# Patient Record
Sex: FEMALE | Race: BLACK OR AFRICAN AMERICAN | Employment: OTHER | URBAN - METROPOLITAN AREA
[De-identification: names, ages, dates, MRNs, and addresses within clinical notes are randomized per-mention and may not be internally consistent; named-entity substitution may affect disease eponyms.]

---

## 2017-01-09 ENCOUNTER — OFFICE VISIT (OUTPATIENT)
Dept: INTERNAL MEDICINE | Age: 72
End: 2017-01-09
Attending: INTERNAL MEDICINE

## 2017-01-09 VITALS
WEIGHT: 199.8 LBS | SYSTOLIC BLOOD PRESSURE: 154 MMHG | OXYGEN SATURATION: 100 % | BODY MASS INDEX: 35.4 KG/M2 | TEMPERATURE: 97.7 F | HEIGHT: 63 IN | RESPIRATION RATE: 18 BRPM | DIASTOLIC BLOOD PRESSURE: 76 MMHG | HEART RATE: 79 BPM

## 2017-01-09 DIAGNOSIS — R26.81 UNSTEADY GAIT: Primary | ICD-10-CM

## 2017-04-11 ENCOUNTER — OFFICE VISIT (OUTPATIENT)
Dept: INTERNAL MEDICINE | Age: 72
End: 2017-04-11
Attending: INTERNAL MEDICINE

## 2017-04-11 VITALS
BODY MASS INDEX: 37.2 KG/M2 | SYSTOLIC BLOOD PRESSURE: 171 MMHG | OXYGEN SATURATION: 97 % | DIASTOLIC BLOOD PRESSURE: 85 MMHG | WEIGHT: 210 LBS | HEART RATE: 86 BPM | RESPIRATION RATE: 20 BRPM

## 2017-04-11 DIAGNOSIS — I10 ESSENTIAL HYPERTENSION: ICD-10-CM

## 2017-04-11 DIAGNOSIS — I48.0 PAROXYSMAL ATRIAL FIBRILLATION (HCC): ICD-10-CM

## 2017-04-11 DIAGNOSIS — I50.32 CHRONIC DIASTOLIC CONGESTIVE HEART FAILURE (HCC): Primary | ICD-10-CM

## 2017-04-11 ASSESSMENT — ENCOUNTER SYMPTOMS
CHOKING: 0
COUGH: 0
CHEST TIGHTNESS: 0
SHORTNESS OF BREATH: 0

## 2017-05-03 RX ORDER — EZETIMIBE 10 MG/1
10 TABLET ORAL DAILY
Qty: 30 TABLET | Refills: 0
Start: 2017-05-03 | End: 2017-08-04 | Stop reason: SDUPTHER

## 2017-05-03 RX ORDER — CARVEDILOL 25 MG/1
25 TABLET ORAL 2 TIMES DAILY WITH MEALS
Qty: 60 TABLET | Refills: 0
Start: 2017-05-03 | End: 2017-08-04 | Stop reason: SDUPTHER

## 2017-05-03 RX ORDER — LISINOPRIL 40 MG/1
40 TABLET ORAL DAILY
Qty: 30 TABLET | Refills: 0
Start: 2017-05-03 | End: 2017-08-04 | Stop reason: SDUPTHER

## 2017-05-03 RX ORDER — FUROSEMIDE 40 MG/1
40 TABLET ORAL DAILY
Qty: 30 TABLET | Refills: 2
Start: 2017-05-03 | End: 2017-08-04 | Stop reason: SDUPTHER

## 2017-05-03 RX ORDER — CARVEDILOL 25 MG/1
25 TABLET ORAL 2 TIMES DAILY WITH MEALS
Qty: 60 TABLET | Refills: 5 | Status: SHIPPED | OUTPATIENT
Start: 2017-05-03 | End: 2017-05-03 | Stop reason: SDUPTHER

## 2017-05-03 RX ORDER — GLIMEPIRIDE 4 MG/1
8 TABLET ORAL EVERY MORNING
Qty: 60 TABLET | Refills: 0
Start: 2017-05-03 | End: 2017-08-04 | Stop reason: SDUPTHER

## 2017-05-03 RX ORDER — AMLODIPINE BESYLATE 10 MG/1
10 TABLET ORAL DAILY
Qty: 30 TABLET | Refills: 0
Start: 2017-05-03 | End: 2017-08-04 | Stop reason: SDUPTHER

## 2017-05-08 ENCOUNTER — OFFICE VISIT (OUTPATIENT)
Dept: INTERNAL MEDICINE | Age: 72
End: 2017-05-08
Attending: INTERNAL MEDICINE

## 2017-05-08 VITALS
DIASTOLIC BLOOD PRESSURE: 96 MMHG | TEMPERATURE: 97.7 F | OXYGEN SATURATION: 99 % | BODY MASS INDEX: 36.68 KG/M2 | HEART RATE: 87 BPM | RESPIRATION RATE: 20 BRPM | HEIGHT: 63 IN | SYSTOLIC BLOOD PRESSURE: 171 MMHG | WEIGHT: 207 LBS

## 2017-05-08 DIAGNOSIS — E55.9 HYPOVITAMINOSIS D: ICD-10-CM

## 2017-05-08 DIAGNOSIS — E78.2 MIXED HYPERLIPIDEMIA: Chronic | ICD-10-CM

## 2017-05-08 DIAGNOSIS — I10 ESSENTIAL HYPERTENSION: Chronic | ICD-10-CM

## 2017-05-08 DIAGNOSIS — E11.9 TYPE 2 DIABETES MELLITUS WITHOUT COMPLICATION, WITHOUT LONG-TERM CURRENT USE OF INSULIN (HCC): Primary | Chronic | ICD-10-CM

## 2017-05-08 LAB
ANION GAP SERPL CALCULATED.3IONS-SCNC: 15 MMOL/L (ref 3–16)
BACTERIA: ABNORMAL /HPF
BASOPHILS ABSOLUTE: 0 K/UL (ref 0–0.2)
BASOPHILS RELATIVE PERCENT: 1 %
BILIRUBIN URINE: NEGATIVE
BLOOD, URINE: ABNORMAL
BUN BLDV-MCNC: 27 MG/DL (ref 7–20)
CALCIUM SERPL-MCNC: 9.1 MG/DL (ref 8.3–10.6)
CHLORIDE BLD-SCNC: 96 MMOL/L (ref 99–110)
CHOLESTEROL, TOTAL: 258 MG/DL (ref 0–199)
CLARITY: ABNORMAL
CO2: 26 MMOL/L (ref 21–32)
COLOR: YELLOW
CREAT SERPL-MCNC: 1.4 MG/DL (ref 0.6–1.2)
CRYSTALS, UA: ABNORMAL /HPF
EOSINOPHILS ABSOLUTE: 0.1 K/UL (ref 0–0.6)
EOSINOPHILS RELATIVE PERCENT: 2.9 %
EPITHELIAL CELLS, UA: ABNORMAL /HPF
GFR AFRICAN AMERICAN: 45
GFR NON-AFRICAN AMERICAN: 37
GLUCOSE BLD-MCNC: 129 MG/DL (ref 70–99)
GLUCOSE URINE: NEGATIVE MG/DL
HCT VFR BLD CALC: 36.9 % (ref 36–48)
HDLC SERPL-MCNC: 87 MG/DL (ref 40–60)
HEMOGLOBIN: 12 G/DL (ref 12–16)
KETONES, URINE: NEGATIVE MG/DL
LDL CHOLESTEROL CALCULATED: 153 MG/DL
LEUKOCYTE ESTERASE, URINE: ABNORMAL
LYMPHOCYTES ABSOLUTE: 1.7 K/UL (ref 1–5.1)
LYMPHOCYTES RELATIVE PERCENT: 35.3 %
MCH RBC QN AUTO: 29.3 PG (ref 26–34)
MCHC RBC AUTO-ENTMCNC: 32.6 G/DL (ref 31–36)
MCV RBC AUTO: 89.8 FL (ref 80–100)
MICROSCOPIC EXAMINATION: YES
MONOCYTES ABSOLUTE: 0.5 K/UL (ref 0–1.3)
MONOCYTES RELATIVE PERCENT: 9.7 %
NEUTROPHILS ABSOLUTE: 2.4 K/UL (ref 1.7–7.7)
NEUTROPHILS RELATIVE PERCENT: 51.1 %
NITRITE, URINE: NEGATIVE
PDW BLD-RTO: 13.9 % (ref 12.4–15.4)
PH UA: 7.5
PLATELET # BLD: 202 K/UL (ref 135–450)
PMV BLD AUTO: 10 FL (ref 5–10.5)
POTASSIUM SERPL-SCNC: 3.8 MMOL/L (ref 3.5–5.1)
PROTEIN UA: 100 MG/DL
RBC # BLD: 4.11 M/UL (ref 4–5.2)
RBC UA: ABNORMAL /HPF (ref 0–2)
SODIUM BLD-SCNC: 137 MMOL/L (ref 136–145)
SPECIFIC GRAVITY UA: 1.01
TRIGL SERPL-MCNC: 92 MG/DL (ref 0–150)
URINE TYPE: ABNORMAL
UROBILINOGEN, URINE: 0.2 E.U./DL
VITAMIN D 25-HYDROXY: 20.4 NG/ML
VLDLC SERPL CALC-MCNC: 18 MG/DL
WBC # BLD: 4.7 K/UL (ref 4–11)
WBC UA: ABNORMAL /HPF (ref 0–5)

## 2017-05-09 LAB
CREATININE URINE: 52.3 MG/DL (ref 28–259)
ESTIMATED AVERAGE GLUCOSE: 165.7 MG/DL
HBA1C MFR BLD: 7.4 %
MICROALBUMIN UR-MCNC: 98.7 MG/DL
MICROALBUMIN/CREAT UR-RTO: 1887.2 MG/G (ref 0–30)

## 2017-08-04 RX ORDER — LISINOPRIL 40 MG/1
40 TABLET ORAL DAILY
Qty: 30 TABLET | Refills: 2
Start: 2017-08-04 | End: 2017-11-15 | Stop reason: ALTCHOICE

## 2017-08-04 RX ORDER — EZETIMIBE 10 MG/1
10 TABLET ORAL DAILY
Qty: 30 TABLET | Refills: 2
Start: 2017-08-04 | End: 2018-03-20 | Stop reason: SDUPTHER

## 2017-08-04 RX ORDER — AMLODIPINE BESYLATE 10 MG/1
10 TABLET ORAL DAILY
Qty: 30 TABLET | Refills: 2
Start: 2017-08-04 | End: 2018-03-19 | Stop reason: SDUPTHER

## 2017-08-04 RX ORDER — GLIMEPIRIDE 4 MG/1
8 TABLET ORAL EVERY MORNING
Qty: 60 TABLET | Refills: 2
Start: 2017-08-04 | End: 2018-03-19 | Stop reason: SDUPTHER

## 2017-08-04 RX ORDER — FUROSEMIDE 40 MG/1
40 TABLET ORAL DAILY
Qty: 30 TABLET | Refills: 2
Start: 2017-08-04 | End: 2018-03-26 | Stop reason: SDUPTHER

## 2017-08-04 RX ORDER — CARVEDILOL 25 MG/1
25 TABLET ORAL 2 TIMES DAILY WITH MEALS
Qty: 60 TABLET | Refills: 2
Start: 2017-08-04 | End: 2018-03-19 | Stop reason: SDUPTHER

## 2017-08-16 ENCOUNTER — OFFICE VISIT (OUTPATIENT)
Dept: INTERNAL MEDICINE | Age: 72
End: 2017-08-16
Attending: STUDENT IN AN ORGANIZED HEALTH CARE EDUCATION/TRAINING PROGRAM

## 2017-08-16 VITALS
WEIGHT: 214.6 LBS | DIASTOLIC BLOOD PRESSURE: 83 MMHG | OXYGEN SATURATION: 100 % | SYSTOLIC BLOOD PRESSURE: 152 MMHG | TEMPERATURE: 97.2 F | HEART RATE: 73 BPM | BODY MASS INDEX: 38.01 KG/M2 | RESPIRATION RATE: 20 BRPM

## 2017-08-16 DIAGNOSIS — E66.9 DIABETES MELLITUS TYPE 2 IN OBESE (HCC): Primary | ICD-10-CM

## 2017-08-16 DIAGNOSIS — E11.69 DIABETES MELLITUS TYPE 2 IN OBESE (HCC): Primary | ICD-10-CM

## 2017-08-16 DIAGNOSIS — I10 ESSENTIAL HYPERTENSION: Chronic | ICD-10-CM

## 2017-08-16 RX ORDER — ROSUVASTATIN CALCIUM 20 MG/1
20 TABLET, COATED ORAL NIGHTLY
Qty: 60 TABLET | Refills: 3 | Status: SHIPPED | OUTPATIENT
Start: 2017-08-16 | End: 2018-04-25 | Stop reason: SDUPTHER

## 2017-08-16 ASSESSMENT — ENCOUNTER SYMPTOMS
GASTROINTESTINAL NEGATIVE: 1
RESPIRATORY NEGATIVE: 1
EYES NEGATIVE: 1

## 2017-09-26 ENCOUNTER — OFFICE VISIT (OUTPATIENT)
Dept: INTERNAL MEDICINE | Age: 72
End: 2017-09-26
Attending: INTERNAL MEDICINE

## 2017-09-26 VITALS
BODY MASS INDEX: 38.44 KG/M2 | DIASTOLIC BLOOD PRESSURE: 89 MMHG | OXYGEN SATURATION: 97 % | WEIGHT: 217 LBS | HEART RATE: 84 BPM | RESPIRATION RATE: 20 BRPM | SYSTOLIC BLOOD PRESSURE: 166 MMHG

## 2017-09-26 DIAGNOSIS — I48.0 PAROXYSMAL ATRIAL FIBRILLATION (HCC): ICD-10-CM

## 2017-09-26 DIAGNOSIS — I10 ESSENTIAL HYPERTENSION: ICD-10-CM

## 2017-09-26 DIAGNOSIS — I50.32 CHRONIC DIASTOLIC CONGESTIVE HEART FAILURE (HCC): Primary | ICD-10-CM

## 2017-09-26 ASSESSMENT — ENCOUNTER SYMPTOMS
CHOKING: 0
COUGH: 0
CHEST TIGHTNESS: 0
SHORTNESS OF BREATH: 0

## 2017-11-15 ENCOUNTER — OFFICE VISIT (OUTPATIENT)
Dept: INTERNAL MEDICINE | Age: 72
End: 2017-11-15
Attending: STUDENT IN AN ORGANIZED HEALTH CARE EDUCATION/TRAINING PROGRAM

## 2017-11-15 DIAGNOSIS — E66.9 DIABETES MELLITUS TYPE 2 IN OBESE (HCC): Primary | ICD-10-CM

## 2017-11-15 DIAGNOSIS — E11.69 DIABETES MELLITUS TYPE 2 IN OBESE (HCC): Primary | ICD-10-CM

## 2017-11-15 DIAGNOSIS — E11.22 CKD STAGE 3 DUE TO TYPE 2 DIABETES MELLITUS (HCC): ICD-10-CM

## 2017-11-15 DIAGNOSIS — I10 ESSENTIAL HYPERTENSION: Chronic | ICD-10-CM

## 2017-11-15 DIAGNOSIS — N18.30 CKD STAGE 3 DUE TO TYPE 2 DIABETES MELLITUS (HCC): ICD-10-CM

## 2017-11-15 RX ORDER — VALSARTAN 320 MG/1
320 TABLET ORAL DAILY
Qty: 30 TABLET | Refills: 3 | Status: SHIPPED | OUTPATIENT
Start: 2017-11-15 | End: 2018-03-20 | Stop reason: ALTCHOICE

## 2017-11-16 NOTE — PROGRESS NOTES
WBCUA 3-5 05/08/2017    RBCUA 0-2 05/08/2017    BACTERIA 4+ 05/08/2017    CLARITYU CLOUDY 05/08/2017    SPECGRAV 1.015 05/08/2017    LEUKOCYTESUR SMALL 05/08/2017    BLOODU TRACE-INTACT 05/08/2017    GLUCOSEU Negative 05/08/2017             RADIOLOGY / OTHER PROCEDURES:     No orders to display           Assessment/Plan     Fabi Calhoun a 65 y. o. female, who is here for follow-up of HTN, DM, CKD (Cr 1.2-1.4 over past 2 years), and diastolic CHF.     DM type 2  Most recent Hba1c 7.4% on 5/8/2017, to be repeated, drawn before next visit.  -Continue Amaryl 4mg daily  -Still needs to schedule appointment with Ophthalmology for diabetic eye exam, will continue to remind her      Essential HTN - BP elevated today. Pt measures at home, usually 130-150s/70s-90s. - Stop Lisinopril 40 mg daily  - Start valsartan 320 daily  - Cont lasix 40 mg daily  - Change Amlodipine 10 mg to nightly w second dose of carvedilol     Diastolic CHF  -Cont Carvedilol 25 mg bid      HLD  -Continue rosuvastatin 20mg daily     Vit D deficiency  -Continue calcium-Vit D and additional Vit supplements for 800IU daily      Pt will f/u in 2 months, labs ordered at this visit, to be drawn before next one. Patient Active Problem List    Diagnosis Date Noted    Pre-syncope 08/18/2014    PAT (acute kidney injury) ([facility] 08/18/2014    Diabetes mellitus ([facility] 09/04/2012    HTN (hypertension) 09/04/2012    HLD (hyperlipidemia) 09/04/2012    Paroxysmal a-fib ([facility] 09/04/2012    Hypovitaminosis D 84/91/7239    Diastolic CHF, chronic ([facility] 09/04/2012    Depression 09/04/2012             Discussed with attending:  Dr. Ciro Davila        Signed,     Rudell Sever, MD - PGY1  Pager #: [phone]    11/16/2017  5:01 AM     Addendum to Resident H& P/Progress note:  I have personally seen,examined and evaluated the patient.  I have reviewed the current history, physical findings, labs and assessment and plan and agree with note as documented by resident MD ( )      Néstor Ardon MD, [address]

## 2018-03-16 ENCOUNTER — TELEPHONE (OUTPATIENT)
Dept: INTERNAL MEDICINE | Age: 73
End: 2018-03-16

## 2018-03-16 NOTE — TELEPHONE ENCOUNTER
Received refill request for Lisinopril which was discontinued 11-16-17. Other medications not refilled since October 2017 according to [facility] record. Called patient  and she explained that she had lost her medications. she got  new prescriptions in August 2017 with 2 refills. After filling these she found the old medications that were lost and states she has been taking them. I told her the Lisinopril was stopped but she thinks she has been taking it. Patient has appointment in [facility] on Tuesday 3-20-18. She states she has enough medication until then. I told her to bring in all her bottles to reconcile her medications with   3-20-18. We can then do refills if no changes made by .

## 2018-03-19 DIAGNOSIS — E66.9 DIABETES MELLITUS TYPE 2 IN OBESE (HCC): ICD-10-CM

## 2018-03-19 DIAGNOSIS — E11.22 CKD STAGE 3 DUE TO TYPE 2 DIABETES MELLITUS (HCC): ICD-10-CM

## 2018-03-19 DIAGNOSIS — I10 ESSENTIAL HYPERTENSION: Chronic | ICD-10-CM

## 2018-03-19 DIAGNOSIS — N18.30 CKD STAGE 3 DUE TO TYPE 2 DIABETES MELLITUS (HCC): ICD-10-CM

## 2018-03-19 DIAGNOSIS — E11.69 DIABETES MELLITUS TYPE 2 IN OBESE (HCC): ICD-10-CM

## 2018-03-19 LAB
ALBUMIN SERPL-MCNC: 3.8 G/DL (ref 3.4–5)
ANION GAP SERPL CALCULATED.3IONS-SCNC: 13 MMOL/L (ref 3–16)
BASOPHILS ABSOLUTE: 0 K/UL (ref 0–0.2)
BASOPHILS RELATIVE PERCENT: 0.5 %
BUN BLDV-MCNC: 34 MG/DL (ref 7–20)
CALCIUM SERPL-MCNC: 8.4 MG/DL (ref 8.3–10.6)
CHLORIDE BLD-SCNC: 101 MMOL/L (ref 99–110)
CHOLESTEROL, TOTAL: 238 MG/DL (ref 0–199)
CO2: 25 MMOL/L (ref 21–32)
CREAT SERPL-MCNC: 1.5 MG/DL (ref 0.6–1.2)
CREATININE URINE: 81.6 MG/DL (ref 28–259)
EOSINOPHILS ABSOLUTE: 0.1 K/UL (ref 0–0.6)
EOSINOPHILS RELATIVE PERCENT: 1.3 %
ESTIMATED AVERAGE GLUCOSE: 246 MG/DL
GFR AFRICAN AMERICAN: 41
GFR NON-AFRICAN AMERICAN: 34
GLUCOSE BLD-MCNC: 277 MG/DL (ref 70–99)
HBA1C MFR BLD: 10.2 %
HCT VFR BLD CALC: 34 % (ref 36–48)
HDLC SERPL-MCNC: 80 MG/DL (ref 40–60)
HEMOGLOBIN: 11.3 G/DL (ref 12–16)
LDL CHOLESTEROL CALCULATED: 126 MG/DL
LYMPHOCYTES ABSOLUTE: 1.6 K/UL (ref 1–5.1)
LYMPHOCYTES RELATIVE PERCENT: 33.5 %
MAGNESIUM: 2.4 MG/DL (ref 1.8–2.4)
MCH RBC QN AUTO: 29.7 PG (ref 26–34)
MCHC RBC AUTO-ENTMCNC: 33.3 G/DL (ref 31–36)
MCV RBC AUTO: 89.2 FL (ref 80–100)
MICROALBUMIN UR-MCNC: 88.9 MG/DL
MICROALBUMIN/CREAT UR-RTO: 1089.5 MG/G (ref 0–30)
MONOCYTES ABSOLUTE: 0.6 K/UL (ref 0–1.3)
MONOCYTES RELATIVE PERCENT: 13.6 %
NEUTROPHILS ABSOLUTE: 2.4 K/UL (ref 1.7–7.7)
NEUTROPHILS RELATIVE PERCENT: 51.1 %
PDW BLD-RTO: 15.1 % (ref 12.4–15.4)
PHOSPHORUS: 3.8 MG/DL (ref 2.5–4.9)
PLATELET # BLD: 229 K/UL (ref 135–450)
PMV BLD AUTO: 10.3 FL (ref 5–10.5)
POTASSIUM SERPL-SCNC: 4.8 MMOL/L (ref 3.5–5.1)
RBC # BLD: 3.81 M/UL (ref 4–5.2)
SODIUM BLD-SCNC: 139 MMOL/L (ref 136–145)
TRIGL SERPL-MCNC: 161 MG/DL (ref 0–150)
TSH REFLEX: 2.52 UIU/ML (ref 0.27–4.2)
VLDLC SERPL CALC-MCNC: 32 MG/DL
WBC # BLD: 4.7 K/UL (ref 4–11)

## 2018-03-19 RX ORDER — CARVEDILOL 25 MG/1
25 TABLET ORAL 2 TIMES DAILY WITH MEALS
Qty: 60 TABLET | Refills: 3
Start: 2018-03-19 | End: 2018-04-25 | Stop reason: SDUPTHER

## 2018-03-19 RX ORDER — GLIMEPIRIDE 4 MG/1
8 TABLET ORAL EVERY MORNING
Qty: 60 TABLET | Refills: 2 | OUTPATIENT
Start: 2018-03-19 | End: 2018-04-25 | Stop reason: SDUPTHER

## 2018-03-19 RX ORDER — LISINOPRIL 40 MG/1
TABLET ORAL
COMMUNITY
Start: 2018-01-10 | End: 2018-03-20 | Stop reason: ALTCHOICE

## 2018-03-19 RX ORDER — AMLODIPINE BESYLATE 10 MG/1
10 TABLET ORAL DAILY
Qty: 30 TABLET | Refills: 3
Start: 2018-03-19 | End: 2018-04-25 | Stop reason: SDUPTHER

## 2018-03-20 ENCOUNTER — OFFICE VISIT (OUTPATIENT)
Dept: INTERNAL MEDICINE | Age: 73
End: 2018-03-20
Attending: INTERNAL MEDICINE

## 2018-03-20 VITALS
DIASTOLIC BLOOD PRESSURE: 64 MMHG | SYSTOLIC BLOOD PRESSURE: 130 MMHG | BODY MASS INDEX: 38.09 KG/M2 | WEIGHT: 215 LBS | HEART RATE: 82 BPM | RESPIRATION RATE: 18 BRPM | OXYGEN SATURATION: 98 %

## 2018-03-20 DIAGNOSIS — I10 ESSENTIAL HYPERTENSION: ICD-10-CM

## 2018-03-20 DIAGNOSIS — I48.0 PAROXYSMAL ATRIAL FIBRILLATION (HCC): ICD-10-CM

## 2018-03-20 DIAGNOSIS — I50.32 CHRONIC DIASTOLIC CONGESTIVE HEART FAILURE (HCC): Primary | ICD-10-CM

## 2018-03-20 RX ORDER — LISINOPRIL 40 MG/1
40 TABLET ORAL DAILY
Qty: 30 TABLET | Refills: 2 | OUTPATIENT
Start: 2018-03-20 | End: 2018-04-25 | Stop reason: SDUPTHER

## 2018-03-20 RX ORDER — EZETIMIBE 10 MG/1
10 TABLET ORAL DAILY
Qty: 30 TABLET | Refills: 2
Start: 2018-03-20 | End: 2018-04-25 | Stop reason: SDUPTHER

## 2018-03-20 ASSESSMENT — ENCOUNTER SYMPTOMS
COUGH: 0
SHORTNESS OF BREATH: 0
CHOKING: 0
CHEST TIGHTNESS: 0

## 2018-03-20 NOTE — PATIENT INSTRUCTIONS
Return to clinic 6 months    See Dr Arango Rank next week    Continue lisinopril 40 mg    Do not start valsartan    Dr Barbara Berry [phone] kidney doctor- need to make appointment

## 2018-03-20 NOTE — PROGRESS NOTES
HENT:   Head: Normocephalic and atraumatic. Eyes: Conjunctivae and EOM are normal. Right eye exhibits no discharge. Left eye exhibits no discharge. Neck: Normal range of motion. No JVD present. Cardiovascular: Normal rate, regular rhythm, S1 normal, S2 normal and normal heart sounds. Exam reveals no gallop. No murmur heard. Pulses:       Radial pulses are 2+ on the right side, and 2+ on the left side. Pulmonary/Chest: Effort normal and breath sounds normal. No respiratory distress. She has no wheezes. She has no rales. Abdominal: Soft. Bowel sounds are normal. There is no tenderness. Musculoskeletal: Normal range of motion. She exhibits no edema. Neurological: She is alert and oriented to person, place, and time. Skin: Skin is warm and dry. Psychiatric: She has a normal mood and affect. Her behavior is normal. Thought content normal.       Assessment:      1. Chronic diastolic congestive heart failure (HCC)     2. Paroxysmal atrial fibrillation ([address]     3. Essential hypertension       \      Plan:      CV stable. Rhythm stable. BP reasonable. Will have IM address DM. Renal evaluation. Continues off cigarettes. Reviewed previous records and testing including echo 4/16. No changes. Continue to monitor. Follow up 6 months.

## 2018-03-26 RX ORDER — FUROSEMIDE 40 MG/1
40 TABLET ORAL DAILY
Qty: 30 TABLET | Refills: 1
Start: 2018-03-26 | End: 2018-04-25 | Stop reason: SDUPTHER

## 2018-04-25 ENCOUNTER — OFFICE VISIT (OUTPATIENT)
Dept: INTERNAL MEDICINE | Age: 73
End: 2018-04-25
Attending: STUDENT IN AN ORGANIZED HEALTH CARE EDUCATION/TRAINING PROGRAM

## 2018-04-25 VITALS
SYSTOLIC BLOOD PRESSURE: 129 MMHG | DIASTOLIC BLOOD PRESSURE: 65 MMHG | HEART RATE: 80 BPM | OXYGEN SATURATION: 95 % | WEIGHT: 214 LBS | TEMPERATURE: 97.7 F | BODY MASS INDEX: 37.91 KG/M2 | RESPIRATION RATE: 20 BRPM

## 2018-04-25 DIAGNOSIS — E11.9 TYPE 2 DIABETES MELLITUS WITHOUT COMPLICATION, WITHOUT LONG-TERM CURRENT USE OF INSULIN (HCC): Primary | Chronic | ICD-10-CM

## 2018-04-25 DIAGNOSIS — H54.40 BLINDNESS OF RIGHT EYE: ICD-10-CM

## 2018-04-25 RX ORDER — ASPIRIN 81 MG/1
81 TABLET, CHEWABLE ORAL DAILY
Qty: 90 TABLET | Refills: 3 | Status: SHIPPED | OUTPATIENT
Start: 2018-04-25 | End: 2018-09-12 | Stop reason: SDUPTHER

## 2018-04-25 RX ORDER — LISINOPRIL 40 MG/1
40 TABLET ORAL DAILY
Qty: 30 TABLET | Refills: 2 | Status: SHIPPED | OUTPATIENT
Start: 2018-04-25 | End: 2018-09-12 | Stop reason: SDUPTHER

## 2018-04-25 RX ORDER — CARVEDILOL 25 MG/1
25 TABLET ORAL 2 TIMES DAILY WITH MEALS
Qty: 60 TABLET | Refills: 3 | Status: SHIPPED | OUTPATIENT
Start: 2018-04-25 | End: 2018-09-12 | Stop reason: SDUPTHER

## 2018-04-25 RX ORDER — GLIMEPIRIDE 4 MG/1
8 TABLET ORAL EVERY MORNING
Qty: 60 TABLET | Refills: 3 | Status: SHIPPED | OUTPATIENT
Start: 2018-04-25 | End: 2018-09-12 | Stop reason: SDUPTHER

## 2018-04-25 RX ORDER — ROSUVASTATIN CALCIUM 20 MG/1
20 TABLET, COATED ORAL NIGHTLY
Qty: 60 TABLET | Refills: 3 | Status: SHIPPED | OUTPATIENT
Start: 2018-04-25 | End: 2018-05-02 | Stop reason: CLARIF

## 2018-04-25 RX ORDER — AMLODIPINE BESYLATE 10 MG/1
10 TABLET ORAL DAILY
Qty: 30 TABLET | Refills: 3 | Status: SHIPPED | OUTPATIENT
Start: 2018-04-25 | End: 2018-09-12 | Stop reason: SDUPTHER

## 2018-04-25 RX ORDER — FUROSEMIDE 40 MG/1
40 TABLET ORAL DAILY
Qty: 30 TABLET | Refills: 2 | Status: SHIPPED | OUTPATIENT
Start: 2018-04-25 | End: 2018-09-12 | Stop reason: SDUPTHER

## 2018-04-25 RX ORDER — EZETIMIBE 10 MG/1
10 TABLET ORAL DAILY
Qty: 30 TABLET | Refills: 2 | Status: SHIPPED | OUTPATIENT
Start: 2018-04-25 | End: 2018-09-12 | Stop reason: SDUPTHER

## 2018-05-02 RX ORDER — ATORVASTATIN CALCIUM 20 MG/1
40 TABLET, FILM COATED ORAL DAILY
Qty: 30 TABLET | Refills: 3 | Status: SHIPPED | OUTPATIENT
Start: 2018-05-02 | End: 2018-09-12 | Stop reason: SDUPTHER

## 2018-08-30 LAB
ESTIMATED AVERAGE GLUCOSE: 165.7 MG/DL
HBA1C MFR BLD: 7.4 %

## 2018-09-12 ENCOUNTER — OFFICE VISIT (OUTPATIENT)
Dept: INTERNAL MEDICINE CLINIC | Age: 73
End: 2018-09-12
Payer: MEDICARE

## 2018-09-12 VITALS
RESPIRATION RATE: 20 BRPM | TEMPERATURE: 98 F | SYSTOLIC BLOOD PRESSURE: 122 MMHG | WEIGHT: 215 LBS | DIASTOLIC BLOOD PRESSURE: 74 MMHG | HEART RATE: 76 BPM | OXYGEN SATURATION: 98 % | BODY MASS INDEX: 38.09 KG/M2

## 2018-09-12 DIAGNOSIS — I10 HYPERTENSION, UNSPECIFIED TYPE: Primary | ICD-10-CM

## 2018-09-12 PROCEDURE — 99213 OFFICE O/P EST LOW 20 MIN: CPT | Performed by: STUDENT IN AN ORGANIZED HEALTH CARE EDUCATION/TRAINING PROGRAM

## 2018-09-12 RX ORDER — ASPIRIN 81 MG/1
81 TABLET, CHEWABLE ORAL DAILY
Qty: 90 TABLET | Refills: 3 | Status: SHIPPED | OUTPATIENT
Start: 2018-09-12 | End: 2020-09-25 | Stop reason: SDUPTHER

## 2018-09-12 RX ORDER — GLIMEPIRIDE 4 MG/1
8 TABLET ORAL EVERY MORNING
Qty: 60 TABLET | Refills: 3 | Status: SHIPPED | OUTPATIENT
Start: 2018-09-12 | End: 2019-10-16 | Stop reason: SDUPTHER

## 2018-09-12 RX ORDER — ATORVASTATIN CALCIUM 20 MG/1
40 TABLET, FILM COATED ORAL DAILY
Qty: 30 TABLET | Refills: 3 | Status: SHIPPED | OUTPATIENT
Start: 2018-09-12 | End: 2018-10-17 | Stop reason: DRUGHIGH

## 2018-09-12 RX ORDER — CARVEDILOL 25 MG/1
25 TABLET ORAL 2 TIMES DAILY WITH MEALS
Qty: 60 TABLET | Refills: 3 | Status: SHIPPED | OUTPATIENT
Start: 2018-09-12 | End: 2019-07-30 | Stop reason: SDUPTHER

## 2018-09-12 RX ORDER — EZETIMIBE 10 MG/1
10 TABLET ORAL DAILY
Qty: 30 TABLET | Refills: 3 | Status: SHIPPED | OUTPATIENT
Start: 2018-09-12 | End: 2019-07-30 | Stop reason: SDUPTHER

## 2018-09-12 RX ORDER — AMLODIPINE BESYLATE 10 MG/1
10 TABLET ORAL DAILY
Qty: 30 TABLET | Refills: 3 | Status: SHIPPED | OUTPATIENT
Start: 2018-09-12 | End: 2019-07-30 | Stop reason: SDUPTHER

## 2018-09-12 RX ORDER — FUROSEMIDE 40 MG/1
40 TABLET ORAL DAILY
Qty: 30 TABLET | Refills: 3 | Status: SHIPPED | OUTPATIENT
Start: 2018-09-12 | End: 2019-07-30 | Stop reason: SDUPTHER

## 2018-09-12 RX ORDER — LISINOPRIL 40 MG/1
40 TABLET ORAL DAILY
Qty: 30 TABLET | Refills: 3 | Status: SHIPPED | OUTPATIENT
Start: 2018-09-12 | End: 2019-07-30 | Stop reason: SDUPTHER

## 2018-09-12 NOTE — PROGRESS NOTES
Department of Internal Medicine  OUTPATIENT CLINIC  Medical Resident Progress Note    Date: 09/12/18                                               Subjective:     74 y.o.  female presents to clinic today for follow up of hypertension, DM2, CKD (Cr 1.5 in March 7958), and diastolic HF. She has no complaints and is compliant with her medications, taking them as discussed last visit. Most recent A1c on 8/29 was 7.4% after patient changed her diet dramatically upon finding out her HbA1c in March 2017 was 10.2. Still maintaining a reasonably healthy diet with large amount of vegetables and less sugary foods, occasionally will have fried chicken or other fatty foods. She still checks her blood sugars and BPs regularly, and her 3 month average sugar on her glucometer is 135. Her BPs have been lower than prior after addition of valsartan. Her only complaint is feeling like her vision has been gradually getting worse over the past few months, and she plans to see an ophthalmologist before our next visit. She takes several extra vitamins and supplements including vitamin E, D, Ca, omega 3. She denies any headache, fever, malaise, CP, SOB, increased FARRELL, n/v/d, dysuria, changes in BMs. She still refuses any vaccines, colonoscopy, or mammograms, benefits of early detection/prevention of disease discussed. Objective:     Review of Systems  ROS negative except as described in HPI      Vitals:  /74 (Site: Left Upper Arm, Position: Sitting, Cuff Size: Medium Adult)   Pulse 76   Temp 98 °F (36.7 °C)   Resp 20   Wt 215 lb (97.5 kg)   SpO2 98%   BMI 38.09 kg/m²       PHYSICAL EXAM:  Physical Exam  · General appearance: alert, pleasant, appears younger than stated age, cooperative  · Skin: Skin color, texture, turgor normal.   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    · Lungs: clear to auscultation

## 2018-09-12 NOTE — PATIENT INSTRUCTIONS
Before any of you medication is completely gone, call your pharmacy AT LEAST 1 WEEK ahead for refills. Review all information regarding your medication before starting. If you become ill when the clinic is closed, please call the [facility] [employer]  at   #[phone] and ask the  to page the AOD between 6:00 AM and 6:00 PM or page the AON between 6:00 PM and 6:00 am    The clinic is not able to process MY CHART requests for appointments or messages including requests. Please call the [address] [place] [address] at [phone]  For appointments and messages. Call your pharmacy for medication refills.        Return to clinic 6 months    Continue medication as listed on discharge sheet    Instructions reviewed before discharge and copy given to patient    [address] [phone]

## 2018-10-17 RX ORDER — ATORVASTATIN CALCIUM 40 MG/1
40 TABLET, FILM COATED ORAL DAILY
Qty: 30 TABLET | Refills: 3 | OUTPATIENT
Start: 2018-10-17 | End: 2019-07-30 | Stop reason: SDUPTHER

## 2018-11-06 ENCOUNTER — OFFICE VISIT (OUTPATIENT)
Dept: INTERNAL MEDICINE CLINIC | Age: 73
End: 2018-11-06
Payer: MEDICARE

## 2018-11-06 VITALS
OXYGEN SATURATION: 95 % | SYSTOLIC BLOOD PRESSURE: 147 MMHG | WEIGHT: 205.5 LBS | HEART RATE: 91 BPM | RESPIRATION RATE: 21 BRPM | BODY MASS INDEX: 36.4 KG/M2 | DIASTOLIC BLOOD PRESSURE: 79 MMHG

## 2018-11-06 DIAGNOSIS — I10 ESSENTIAL HYPERTENSION: ICD-10-CM

## 2018-11-06 DIAGNOSIS — I48.0 PAROXYSMAL ATRIAL FIBRILLATION (HCC): ICD-10-CM

## 2018-11-06 DIAGNOSIS — I50.32 CHRONIC DIASTOLIC CONGESTIVE HEART FAILURE (HCC): Primary | ICD-10-CM

## 2018-11-06 PROCEDURE — 99213 OFFICE O/P EST LOW 20 MIN: CPT | Performed by: INTERNAL MEDICINE

## 2018-11-06 PROCEDURE — 99215 OFFICE O/P EST HI 40 MIN: CPT | Performed by: INTERNAL MEDICINE

## 2018-11-06 ASSESSMENT — ENCOUNTER SYMPTOMS
CHOKING: 0
SHORTNESS OF BREATH: 0
COUGH: 0
CHEST TIGHTNESS: 0

## 2019-03-08 DIAGNOSIS — E11.9 TYPE 2 DIABETES MELLITUS WITHOUT COMPLICATION, WITHOUT LONG-TERM CURRENT USE OF INSULIN (HCC): Chronic | ICD-10-CM

## 2019-03-08 DIAGNOSIS — I10 ESSENTIAL HYPERTENSION: ICD-10-CM

## 2019-03-08 LAB
ALBUMIN SERPL-MCNC: 4.1 G/DL (ref 3.4–5)
ALP BLD-CCNC: 92 U/L (ref 40–129)
ALT SERPL-CCNC: 8 U/L (ref 10–40)
AST SERPL-CCNC: 13 U/L (ref 15–37)
BILIRUB SERPL-MCNC: <0.2 MG/DL (ref 0–1)
BILIRUBIN DIRECT: <0.2 MG/DL (ref 0–0.3)
BILIRUBIN, INDIRECT: ABNORMAL MG/DL (ref 0–1)
CHOLESTEROL, FASTING: 222 MG/DL (ref 0–199)
HDLC SERPL-MCNC: 73 MG/DL (ref 40–60)
LDL CHOLESTEROL CALCULATED: 127 MG/DL
TOTAL PROTEIN: 7.3 G/DL (ref 6.4–8.2)
TRIGLYCERIDE, FASTING: 109 MG/DL (ref 0–150)
VLDLC SERPL CALC-MCNC: 22 MG/DL

## 2019-03-09 LAB
ESTIMATED AVERAGE GLUCOSE: 180 MG/DL
HBA1C MFR BLD: 7.9 %

## 2019-03-10 LAB — FRUCTOSAMINE: 352 UMOL/L (ref 170–285)

## 2019-03-13 ENCOUNTER — OFFICE VISIT (OUTPATIENT)
Dept: INTERNAL MEDICINE CLINIC | Age: 74
End: 2019-03-13
Payer: MEDICARE

## 2019-03-13 VITALS
OXYGEN SATURATION: 96 % | BODY MASS INDEX: 37.36 KG/M2 | TEMPERATURE: 98.8 F | WEIGHT: 210.9 LBS | RESPIRATION RATE: 16 BRPM | SYSTOLIC BLOOD PRESSURE: 142 MMHG | HEART RATE: 82 BPM | DIASTOLIC BLOOD PRESSURE: 81 MMHG

## 2019-03-13 DIAGNOSIS — Z00.00 PREVENTATIVE HEALTH CARE: Primary | ICD-10-CM

## 2019-03-13 DIAGNOSIS — Z79.818 PREVENTATIVE USE OF AGENTS AFFECTING ESTROGEN RECEPTORS OR LEVELS: ICD-10-CM

## 2019-03-13 DIAGNOSIS — E11.9 TYPE 2 DIABETES MELLITUS WITHOUT COMPLICATION, WITHOUT LONG-TERM CURRENT USE OF INSULIN (HCC): ICD-10-CM

## 2019-03-13 DIAGNOSIS — I50.32 DIASTOLIC CHF, CHRONIC (HCC): ICD-10-CM

## 2019-03-13 DIAGNOSIS — N18.30 CHRONIC KIDNEY DISEASE, STAGE III (MODERATE) (HCC): ICD-10-CM

## 2019-03-13 DIAGNOSIS — R53.83 FATIGUE, UNSPECIFIED TYPE: ICD-10-CM

## 2019-03-13 DIAGNOSIS — I10 ESSENTIAL HYPERTENSION: ICD-10-CM

## 2019-03-13 PROCEDURE — 99213 OFFICE O/P EST LOW 20 MIN: CPT | Performed by: STUDENT IN AN ORGANIZED HEALTH CARE EDUCATION/TRAINING PROGRAM

## 2019-05-14 ENCOUNTER — OFFICE VISIT (OUTPATIENT)
Dept: INTERNAL MEDICINE CLINIC | Age: 74
End: 2019-05-14
Payer: MEDICARE

## 2019-05-14 VITALS
SYSTOLIC BLOOD PRESSURE: 146 MMHG | DIASTOLIC BLOOD PRESSURE: 77 MMHG | WEIGHT: 212.6 LBS | OXYGEN SATURATION: 95 % | HEIGHT: 64 IN | HEART RATE: 93 BPM | BODY MASS INDEX: 36.29 KG/M2 | RESPIRATION RATE: 20 BRPM

## 2019-05-14 DIAGNOSIS — I48.0 PAROXYSMAL ATRIAL FIBRILLATION (HCC): ICD-10-CM

## 2019-05-14 DIAGNOSIS — I50.32 CHRONIC DIASTOLIC CONGESTIVE HEART FAILURE (HCC): Primary | ICD-10-CM

## 2019-05-14 DIAGNOSIS — I10 ESSENTIAL HYPERTENSION: ICD-10-CM

## 2019-05-14 PROCEDURE — 99213 OFFICE O/P EST LOW 20 MIN: CPT | Performed by: INTERNAL MEDICINE

## 2019-05-14 ASSESSMENT — ENCOUNTER SYMPTOMS
CHOKING: 0
COUGH: 0
CHEST TIGHTNESS: 0
SHORTNESS OF BREATH: 0

## 2019-05-14 NOTE — PROGRESS NOTES
Subjective:      Patient ID: Virginia Brizuela is a 74 y.o. female. Hypertension   Pertinent negatives include no chest pain, palpitations or shortness of breath. Here for follow up afib/HTN/CHF. No complaints. Feeling good. No sob. No pnd or orthopnea. BP good at home. No tachycardia. Rhythm stable. No pnd or orthopnea. Past Medical History:   Diagnosis Date    Atrial fibrillation (HCC)     CKD (chronic kidney disease) stage 3, GFR 30-59 ml/min (HCC)     Diabetes mellitus type II     Diastolic CHF, chronic (HCC)     HTN (hypertension)     Hyperlipidemia     Tobacco abuse      No past surgical history on file. Social History     Socioeconomic History    Marital status:      Spouse name: Not on file    Number of children: Not on file    Years of education: Not on file    Highest education level: Not on file   Occupational History    Not on file   Social Needs    Financial resource strain: Not on file    Food insecurity:     Worry: Not on file     Inability: Not on file    Transportation needs:     Medical: Not on file     Non-medical: Not on file   Tobacco Use    Smoking status: Former Smoker     Packs/day: 0.33     Types: Cigarettes    Tobacco comment: Trying to quit   Substance and Sexual Activity    Alcohol use:  Yes     Alcohol/week: 0.0 oz     Comment: about a glass a week, of liquor    Drug use: Not on file    Sexual activity: Not on file   Lifestyle    Physical activity:     Days per week: Not on file     Minutes per session: Not on file    Stress: Not on file   Relationships    Social connections:     Talks on phone: Not on file     Gets together: Not on file     Attends Yarsanism service: Not on file     Active member of club or organization: Not on file     Attends meetings of clubs or organizations: Not on file     Relationship status: Not on file    Intimate partner violence:     Fear of current or ex partner: Not on file     Emotionally abused: Not on file Physically abused: Not on file     Forced sexual activity: Not on file   Other Topics Concern    Not on file   Social History Narrative    Not on file     FH reviewed, noncontributory      Vitals:    05/14/19 0957   BP: (!) 146/77   Pulse: 93   Resp: 20   SpO2: 95%         Review of Systems   Constitutional: Negative for activity change, appetite change and fatigue. Respiratory: Negative for cough, choking, chest tightness and shortness of breath. Cardiovascular: Negative for chest pain, palpitations and leg swelling. Denies PND or orthopnea. No tachycardia or syncope. Neurological: Negative for dizziness, syncope and light-headedness. Psychiatric/Behavioral: Negative for agitation, behavioral problems and confusion. Other systems reviewed negative as done        Objective:   Physical Exam   Constitutional: She is oriented to person, place, and time. She appears well-developed and well-nourished. No distress. HENT:   Head: Normocephalic and atraumatic. Eyes: Conjunctivae and EOM are normal. Right eye exhibits no discharge. Left eye exhibits no discharge. Neck: Normal range of motion. No JVD present. Cardiovascular: Normal rate, regular rhythm, S1 normal, S2 normal and normal heart sounds. Exam reveals no gallop. No murmur heard. Pulses:       Radial pulses are 2+ on the right side, and 2+ on the left side. Pulmonary/Chest: Effort normal and breath sounds normal. No respiratory distress. She has no wheezes. She has no rales. Abdominal: Soft. Bowel sounds are normal. There is no tenderness. Musculoskeletal: Normal range of motion. She exhibits no edema. Neurological: She is alert and oriented to person, place, and time. Skin: Skin is warm and dry. Psychiatric: She has a normal mood and affect. Her behavior is normal. Thought content normal.       Assessment:       Diagnosis Orders   1. Chronic diastolic congestive heart failure (HCC)  Handicap placard   2.  Paroxysmal atrial fibrillation (HCC)  Handicap placard   3. Essential hypertension  Handicap placard           Plan:      CV stable. Feeling good. Rhythm stable. BP reasonable at home. Continues off cigarettes for 3 yrs. Reviewed previous records and testing including echo 4/16. No changes. Continue to monitor. Follow up 6 months.

## 2019-07-30 ENCOUNTER — OFFICE VISIT (OUTPATIENT)
Dept: INTERNAL MEDICINE CLINIC | Age: 74
End: 2019-07-30
Payer: MEDICARE

## 2019-07-30 DIAGNOSIS — E78.41 ELEVATED LIPOPROTEIN(A): ICD-10-CM

## 2019-07-30 DIAGNOSIS — E11.8 TYPE 2 DIABETES MELLITUS WITH COMPLICATION, WITHOUT LONG-TERM CURRENT USE OF INSULIN (HCC): Primary | ICD-10-CM

## 2019-07-30 DIAGNOSIS — I50.32 CHRONIC DIASTOLIC CONGESTIVE HEART FAILURE (HCC): ICD-10-CM

## 2019-07-30 PROCEDURE — 99213 OFFICE O/P EST LOW 20 MIN: CPT | Performed by: STUDENT IN AN ORGANIZED HEALTH CARE EDUCATION/TRAINING PROGRAM

## 2019-07-30 RX ORDER — AMLODIPINE BESYLATE 10 MG/1
10 TABLET ORAL DAILY
Qty: 30 TABLET | Refills: 3 | Status: SHIPPED | OUTPATIENT
Start: 2019-07-30 | End: 2019-12-09 | Stop reason: SDUPTHER

## 2019-07-30 RX ORDER — LISINOPRIL 40 MG/1
40 TABLET ORAL DAILY
Qty: 30 TABLET | Refills: 3 | Status: SHIPPED | OUTPATIENT
Start: 2019-07-30 | End: 2019-10-16 | Stop reason: SDUPTHER

## 2019-07-30 RX ORDER — ATORVASTATIN CALCIUM 40 MG/1
40 TABLET, FILM COATED ORAL DAILY
Qty: 30 TABLET | Refills: 3 | Status: SHIPPED | OUTPATIENT
Start: 2019-07-30 | End: 2019-12-09 | Stop reason: SDUPTHER

## 2019-07-30 RX ORDER — CARVEDILOL 25 MG/1
25 TABLET ORAL 2 TIMES DAILY WITH MEALS
Qty: 60 TABLET | Refills: 3 | Status: SHIPPED | OUTPATIENT
Start: 2019-07-30 | End: 2019-10-16 | Stop reason: SDUPTHER

## 2019-07-30 RX ORDER — EZETIMIBE 10 MG/1
10 TABLET ORAL DAILY
Qty: 30 TABLET | Refills: 3 | Status: SHIPPED | OUTPATIENT
Start: 2019-07-30 | End: 2019-10-16 | Stop reason: SDUPTHER

## 2019-07-30 RX ORDER — FUROSEMIDE 40 MG/1
40 TABLET ORAL DAILY
Qty: 30 TABLET | Refills: 3 | Status: SHIPPED | OUTPATIENT
Start: 2019-07-30 | End: 2020-04-15

## 2019-07-30 ASSESSMENT — ENCOUNTER SYMPTOMS
SHORTNESS OF BREATH: 0
WHEEZING: 0

## 2019-07-30 NOTE — PROGRESS NOTES
Department of Internal Medicine  OUTPATIENT CLINIC  Medical Resident Progress Note    Date: 07/30/19                                               Subjective:     74 y.o. AA female presents to clinic today for follow up of HTN and DM2. She is feeling well with no complaints today. States she is compliant with medications. She has not had regular labs done in quite sometime. States her sugars at home are ~130s. Last seen by cardiologist Dr. Chang Lemus this past may for afib/HTN/CHF. Denies CP/SOB/palpitations/dizziness    Objective:     Review of Systems  Review of Systems   Constitutional: Negative for activity change and appetite change. Respiratory: Negative for shortness of breath and wheezing. Cardiovascular: Negative for chest pain, palpitations and leg swelling.    negative except as described above. Vitals: There were no vitals taken for this visit. PHYSICAL EXAM:  Physical Exam  · General appearance: alert, pleasant, appears younger than stated age, cooperative  · Skin: Skin color, texture, turgor normal.   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 · Lungs: clear to auscultation bilaterally  · Heart: regular rate and rhythm, S1, S2 normal, no murmur, click, rub or gallop  · Abdomen: soft, non-tender; bowel sounds normal; no masses,  no organomegaly. · Extremities: extremities normal, atraumatic, no cyanosis or edema. Feet clean and dry without lesions, nails filed appropriately  · Neurologic: Grossly intact, moving all extremities without difficulty.  Mental status: ANOx3, thought content appropriate. Sensation intact over feet  · Diabetic foot exam done and within normal limits with no skin breakage or decreased sensation      LABS:     CBC:   No results for input(s): WBC, HGB, PLT in the last 72 hours.        U/A:  Lab Results   Component Value Date    COLORU Yellow 05/08/2017    WBCUA 3-5 05/08/2017    RBCUA

## 2019-08-05 ENCOUNTER — HOSPITAL ENCOUNTER (OUTPATIENT)
Dept: NON INVASIVE DIAGNOSTICS | Age: 74
Discharge: HOME | End: 2019-08-05
Payer: MEDICARE

## 2019-08-05 DIAGNOSIS — I50.32 CHRONIC DIASTOLIC CONGESTIVE HEART FAILURE (HCC): ICD-10-CM

## 2019-08-05 LAB
LV EF: 58 %
LVEF MODALITY: NORMAL

## 2019-08-05 PROCEDURE — 93306 TTE W/DOPPLER COMPLETE: CPT

## 2019-10-17 RX ORDER — CARVEDILOL 25 MG/1
TABLET ORAL
Qty: 60 TABLET | Refills: 2 | Status: SHIPPED | OUTPATIENT
Start: 2019-10-17 | End: 2019-12-06 | Stop reason: SDUPTHER

## 2019-10-17 RX ORDER — GLIMEPIRIDE 4 MG/1
TABLET ORAL
Qty: 60 TABLET | Refills: 2 | Status: SHIPPED | OUTPATIENT
Start: 2019-10-17 | End: 2020-04-15

## 2019-10-17 RX ORDER — LISINOPRIL 40 MG/1
TABLET ORAL
Qty: 30 TABLET | Refills: 2 | Status: SHIPPED | OUTPATIENT
Start: 2019-10-17 | End: 2020-09-25 | Stop reason: SDUPTHER

## 2019-10-17 RX ORDER — EZETIMIBE 10 MG/1
TABLET ORAL
Qty: 30 TABLET | Refills: 2 | Status: SHIPPED | OUTPATIENT
Start: 2019-10-17 | End: 2020-05-22

## 2019-11-21 ENCOUNTER — OFFICE VISIT (OUTPATIENT)
Dept: CARDIOLOGY CLINIC | Age: 74
End: 2019-11-21
Payer: MEDICARE

## 2019-11-21 VITALS
WEIGHT: 218 LBS | SYSTOLIC BLOOD PRESSURE: 130 MMHG | HEART RATE: 76 BPM | BODY MASS INDEX: 37.42 KG/M2 | DIASTOLIC BLOOD PRESSURE: 70 MMHG

## 2019-11-21 DIAGNOSIS — I48.0 PAROXYSMAL ATRIAL FIBRILLATION (HCC): ICD-10-CM

## 2019-11-21 DIAGNOSIS — I50.32 CHRONIC DIASTOLIC CONGESTIVE HEART FAILURE (HCC): Primary | ICD-10-CM

## 2019-11-21 DIAGNOSIS — I10 ESSENTIAL HYPERTENSION: ICD-10-CM

## 2019-11-21 PROCEDURE — 4040F PNEUMOC VAC/ADMIN/RCVD: CPT | Performed by: INTERNAL MEDICINE

## 2019-11-21 PROCEDURE — G8427 DOCREV CUR MEDS BY ELIG CLIN: HCPCS | Performed by: INTERNAL MEDICINE

## 2019-11-21 PROCEDURE — 99213 OFFICE O/P EST LOW 20 MIN: CPT | Performed by: INTERNAL MEDICINE

## 2019-11-21 PROCEDURE — G8400 PT W/DXA NO RESULTS DOC: HCPCS | Performed by: INTERNAL MEDICINE

## 2019-11-21 PROCEDURE — 1123F ACP DISCUSS/DSCN MKR DOCD: CPT | Performed by: INTERNAL MEDICINE

## 2019-11-21 PROCEDURE — 1036F TOBACCO NON-USER: CPT | Performed by: INTERNAL MEDICINE

## 2019-11-21 PROCEDURE — G8484 FLU IMMUNIZE NO ADMIN: HCPCS | Performed by: INTERNAL MEDICINE

## 2019-11-21 PROCEDURE — 1090F PRES/ABSN URINE INCON ASSESS: CPT | Performed by: INTERNAL MEDICINE

## 2019-11-21 PROCEDURE — G8417 CALC BMI ABV UP PARAM F/U: HCPCS | Performed by: INTERNAL MEDICINE

## 2019-11-21 ASSESSMENT — ENCOUNTER SYMPTOMS
CHOKING: 0
COUGH: 0
SHORTNESS OF BREATH: 0
CHEST TIGHTNESS: 0

## 2019-12-06 RX ORDER — CARVEDILOL 25 MG/1
TABLET ORAL
Qty: 60 TABLET | Refills: 3 | Status: SHIPPED | OUTPATIENT
Start: 2019-12-06 | End: 2020-05-22

## 2019-12-09 ENCOUNTER — OFFICE VISIT (OUTPATIENT)
Dept: INTERNAL MEDICINE CLINIC | Age: 74
End: 2019-12-09
Payer: MEDICARE

## 2019-12-09 VITALS
BODY MASS INDEX: 36.72 KG/M2 | WEIGHT: 215.1 LBS | OXYGEN SATURATION: 99 % | HEIGHT: 64 IN | HEART RATE: 93 BPM | TEMPERATURE: 98.1 F | DIASTOLIC BLOOD PRESSURE: 85 MMHG | SYSTOLIC BLOOD PRESSURE: 163 MMHG | RESPIRATION RATE: 24 BRPM

## 2019-12-09 DIAGNOSIS — E11.8 TYPE 2 DIABETES MELLITUS WITH COMPLICATION, WITHOUT LONG-TERM CURRENT USE OF INSULIN (HCC): Primary | ICD-10-CM

## 2019-12-09 PROCEDURE — 99213 OFFICE O/P EST LOW 20 MIN: CPT | Performed by: STUDENT IN AN ORGANIZED HEALTH CARE EDUCATION/TRAINING PROGRAM

## 2019-12-09 RX ORDER — ATORVASTATIN CALCIUM 40 MG/1
40 TABLET, FILM COATED ORAL DAILY
Qty: 30 TABLET | Refills: 3 | Status: SHIPPED | OUTPATIENT
Start: 2019-12-09 | End: 2020-09-25 | Stop reason: SDUPTHER

## 2019-12-09 RX ORDER — AMLODIPINE BESYLATE 10 MG/1
10 TABLET ORAL DAILY
Qty: 30 TABLET | Refills: 3 | Status: SHIPPED | OUTPATIENT
Start: 2019-12-09 | End: 2020-04-14 | Stop reason: SDUPTHER

## 2019-12-09 ASSESSMENT — ENCOUNTER SYMPTOMS
COUGH: 0
CHEST TIGHTNESS: 0
EYE PAIN: 0
SHORTNESS OF BREATH: 0
GASTROINTESTINAL NEGATIVE: 1
EYE REDNESS: 0
WHEEZING: 0
ABDOMINAL PAIN: 0
EYE DISCHARGE: 0

## 2020-04-14 RX ORDER — AMLODIPINE BESYLATE 10 MG/1
10 TABLET ORAL DAILY
Qty: 30 TABLET | Refills: 1 | Status: SHIPPED | OUTPATIENT
Start: 2020-04-14 | End: 2020-04-15

## 2020-04-15 RX ORDER — GLIMEPIRIDE 4 MG/1
TABLET ORAL
Qty: 60 TABLET | Refills: 2 | Status: SHIPPED | OUTPATIENT
Start: 2020-04-15 | End: 2020-09-25 | Stop reason: SDUPTHER

## 2020-04-15 RX ORDER — AMLODIPINE BESYLATE 10 MG/1
TABLET ORAL
Qty: 30 TABLET | Refills: 3 | Status: SHIPPED | OUTPATIENT
Start: 2020-04-15 | End: 2020-08-19

## 2020-04-15 RX ORDER — FUROSEMIDE 40 MG/1
TABLET ORAL
Qty: 30 TABLET | Refills: 3 | Status: SHIPPED | OUTPATIENT
Start: 2020-04-15 | End: 2020-09-25 | Stop reason: SDUPTHER

## 2020-05-22 RX ORDER — CARVEDILOL 25 MG/1
TABLET ORAL
Qty: 60 TABLET | Refills: 3 | Status: SHIPPED | OUTPATIENT
Start: 2020-05-22 | End: 2020-09-25 | Stop reason: SDUPTHER

## 2020-05-22 RX ORDER — EZETIMIBE 10 MG/1
TABLET ORAL
Qty: 30 TABLET | Refills: 3 | Status: SHIPPED | OUTPATIENT
Start: 2020-05-22 | End: 2020-09-25 | Stop reason: SDUPTHER

## 2020-08-13 ENCOUNTER — OFFICE VISIT (OUTPATIENT)
Dept: CARDIOLOGY CLINIC | Age: 75
End: 2020-08-13
Payer: MEDICARE

## 2020-08-13 VITALS
DIASTOLIC BLOOD PRESSURE: 80 MMHG | TEMPERATURE: 97.5 F | HEART RATE: 60 BPM | WEIGHT: 219 LBS | SYSTOLIC BLOOD PRESSURE: 124 MMHG | BODY MASS INDEX: 37.59 KG/M2

## 2020-08-13 DIAGNOSIS — E11.8 TYPE 2 DIABETES MELLITUS WITH COMPLICATION, WITHOUT LONG-TERM CURRENT USE OF INSULIN (HCC): ICD-10-CM

## 2020-08-13 LAB
ALBUMIN SERPL-MCNC: 4.1 G/DL (ref 3.4–5)
ALP BLD-CCNC: 92 U/L (ref 40–129)
ALT SERPL-CCNC: 7 U/L (ref 10–40)
ANION GAP SERPL CALCULATED.3IONS-SCNC: 15 MMOL/L (ref 3–16)
AST SERPL-CCNC: 13 U/L (ref 15–37)
BASOPHILS ABSOLUTE: 0 K/UL (ref 0–0.2)
BASOPHILS RELATIVE PERCENT: 0.9 %
BILIRUB SERPL-MCNC: 0.3 MG/DL (ref 0–1)
BILIRUBIN DIRECT: <0.2 MG/DL (ref 0–0.3)
BILIRUBIN, INDIRECT: ABNORMAL MG/DL (ref 0–1)
BUN BLDV-MCNC: 37 MG/DL (ref 7–20)
CALCIUM SERPL-MCNC: 9 MG/DL (ref 8.3–10.6)
CHLORIDE BLD-SCNC: 108 MMOL/L (ref 99–110)
CHOLESTEROL, FASTING: 232 MG/DL (ref 0–199)
CO2: 20 MMOL/L (ref 21–32)
CREAT SERPL-MCNC: 1.8 MG/DL (ref 0.6–1.2)
EOSINOPHILS ABSOLUTE: 0.1 K/UL (ref 0–0.6)
EOSINOPHILS RELATIVE PERCENT: 2.4 %
GFR AFRICAN AMERICAN: 33
GFR NON-AFRICAN AMERICAN: 27
GLUCOSE BLD-MCNC: 164 MG/DL (ref 70–99)
HCT VFR BLD CALC: 35 % (ref 36–48)
HDLC SERPL-MCNC: 86 MG/DL (ref 40–60)
HEMOGLOBIN: 11.6 G/DL (ref 12–16)
LDL CHOLESTEROL CALCULATED: 126 MG/DL
LYMPHOCYTES ABSOLUTE: 1.6 K/UL (ref 1–5.1)
LYMPHOCYTES RELATIVE PERCENT: 31.6 %
MCH RBC QN AUTO: 29.5 PG (ref 26–34)
MCHC RBC AUTO-ENTMCNC: 33 G/DL (ref 31–36)
MCV RBC AUTO: 89.4 FL (ref 80–100)
MONOCYTES ABSOLUTE: 0.5 K/UL (ref 0–1.3)
MONOCYTES RELATIVE PERCENT: 10.3 %
NEUTROPHILS ABSOLUTE: 2.8 K/UL (ref 1.7–7.7)
NEUTROPHILS RELATIVE PERCENT: 54.8 %
PDW BLD-RTO: 14.4 % (ref 12.4–15.4)
PHOSPHORUS: 4.2 MG/DL (ref 2.5–4.9)
PLATELET # BLD: 228 K/UL (ref 135–450)
PMV BLD AUTO: 9.5 FL (ref 5–10.5)
POTASSIUM SERPL-SCNC: 4.5 MMOL/L (ref 3.5–5.1)
RBC # BLD: 3.92 M/UL (ref 4–5.2)
SODIUM BLD-SCNC: 143 MMOL/L (ref 136–145)
TOTAL PROTEIN: 7.2 G/DL (ref 6.4–8.2)
TRIGLYCERIDE, FASTING: 98 MG/DL (ref 0–150)
VLDLC SERPL CALC-MCNC: 20 MG/DL
WBC # BLD: 5.1 K/UL (ref 4–11)

## 2020-08-13 PROCEDURE — G8400 PT W/DXA NO RESULTS DOC: HCPCS | Performed by: INTERNAL MEDICINE

## 2020-08-13 PROCEDURE — 1123F ACP DISCUSS/DSCN MKR DOCD: CPT | Performed by: INTERNAL MEDICINE

## 2020-08-13 PROCEDURE — G8417 CALC BMI ABV UP PARAM F/U: HCPCS | Performed by: INTERNAL MEDICINE

## 2020-08-13 PROCEDURE — 1090F PRES/ABSN URINE INCON ASSESS: CPT | Performed by: INTERNAL MEDICINE

## 2020-08-13 PROCEDURE — G8427 DOCREV CUR MEDS BY ELIG CLIN: HCPCS | Performed by: INTERNAL MEDICINE

## 2020-08-13 PROCEDURE — 99214 OFFICE O/P EST MOD 30 MIN: CPT | Performed by: INTERNAL MEDICINE

## 2020-08-13 PROCEDURE — 4040F PNEUMOC VAC/ADMIN/RCVD: CPT | Performed by: INTERNAL MEDICINE

## 2020-08-13 PROCEDURE — 1036F TOBACCO NON-USER: CPT | Performed by: INTERNAL MEDICINE

## 2020-08-13 ASSESSMENT — ENCOUNTER SYMPTOMS
COUGH: 0
CHOKING: 0
SHORTNESS OF BREATH: 0
CHEST TIGHTNESS: 0

## 2020-08-13 NOTE — PROGRESS NOTES
Subjective:      Patient ID: Yosef Duff is a 66 y.o. female. Congestive Heart Failure   Pertinent negatives include no chest pain, fatigue, palpitations or shortness of breath. Hypertension   Pertinent negatives include no chest pain, palpitations or shortness of breath. Here for follow up afib/HTN/CHF. No complaints. Feeling good. No sob. No pnd or orthopnea. BP good at home. No tachycardia. Rhythm stable. No pnd or orthopnea. Past Medical History:   Diagnosis Date    Atrial fibrillation (HCC)     CKD (chronic kidney disease) stage 3, GFR 30-59 ml/min (HCC)     Diabetes mellitus type II     Diastolic CHF, chronic (HCC)     HTN (hypertension)     Hyperlipidemia     Tobacco abuse      No past surgical history on file. Social History     Socioeconomic History    Marital status:      Spouse name: Not on file    Number of children: Not on file    Years of education: Not on file    Highest education level: Not on file   Occupational History    Not on file   Social Needs    Financial resource strain: Not on file    Food insecurity     Worry: Not on file     Inability: Not on file    Transportation needs     Medical: Not on file     Non-medical: Not on file   Tobacco Use    Smoking status: Former Smoker     Packs/day: 0.33     Types: Cigarettes    Smokeless tobacco: Never Used    Tobacco comment: Trying to quit   Substance and Sexual Activity    Alcohol use:  Yes     Alcohol/week: 0.0 standard drinks     Comment: about a glass a week, of liquor    Drug use: Not on file    Sexual activity: Not on file   Lifestyle    Physical activity     Days per week: Not on file     Minutes per session: Not on file    Stress: Not on file   Relationships    Social connections     Talks on phone: Not on file     Gets together: Not on file     Attends Hoahaoism service: Not on file     Active member of club or organization: Not on file     Attends meetings of clubs or organizations: Not on file     Relationship status: Not on file    Intimate partner violence     Fear of current or ex partner: Not on file     Emotionally abused: Not on file     Physically abused: Not on file     Forced sexual activity: Not on file   Other Topics Concern    Not on file   Social History Narrative    Not on file     FH reviewed, denies FH cardiac issues      Vitals:    08/13/20 1009   BP: 124/80   Pulse: 60   Temp: 97.5 °F (36.4 °C)       wt 219      Review of Systems   Constitutional: Negative for activity change, appetite change and fatigue. Respiratory: Negative for cough, choking, chest tightness and shortness of breath. Cardiovascular: Negative for chest pain, palpitations and leg swelling. Denies PND or orthopnea. No tachycardia or syncope. Neurological: Negative for dizziness, syncope and light-headedness. Psychiatric/Behavioral: Negative for agitation, behavioral problems and confusion. All other systems reviewed negative as done        Objective:   Physical Exam   Constitutional: She is oriented to person, place, and time. She appears well-developed and well-nourished. No distress. HENT:   Head: Normocephalic and atraumatic. Eyes: Conjunctivae and EOM are normal. Right eye exhibits no discharge. Left eye exhibits no discharge. Neck: Normal range of motion. No JVD present. Cardiovascular: Normal rate, regular rhythm, S1 normal, S2 normal and normal heart sounds. Exam reveals no gallop. No murmur heard. Pulses:       Radial pulses are 2+ on the right side and 2+ on the left side. Pulmonary/Chest: Effort normal and breath sounds normal. No respiratory distress. She has no wheezes. She has no rales. Abdominal: Soft. Bowel sounds are normal. There is no abdominal tenderness. Musculoskeletal: Normal range of motion. General: No edema. Neurological: She is alert and oriented to person, place, and time. Skin: Skin is warm and dry.    Psychiatric: She has a normal mood No

## 2020-08-14 LAB
ESTIMATED AVERAGE GLUCOSE: 168.6 MG/DL
HBA1C MFR BLD: 7.5 %

## 2020-08-19 RX ORDER — AMLODIPINE BESYLATE 10 MG/1
TABLET ORAL
Qty: 30 TABLET | Refills: 1 | Status: SHIPPED | OUTPATIENT
Start: 2020-08-19 | End: 2020-09-25 | Stop reason: SDUPTHER

## 2020-09-25 ENCOUNTER — OFFICE VISIT (OUTPATIENT)
Dept: INTERNAL MEDICINE CLINIC | Age: 75
End: 2020-09-25
Payer: MEDICARE

## 2020-09-25 VITALS
TEMPERATURE: 97.3 F | OXYGEN SATURATION: 98 % | DIASTOLIC BLOOD PRESSURE: 82 MMHG | HEIGHT: 64 IN | WEIGHT: 216.7 LBS | SYSTOLIC BLOOD PRESSURE: 144 MMHG | BODY MASS INDEX: 36.99 KG/M2 | HEART RATE: 84 BPM | RESPIRATION RATE: 20 BRPM

## 2020-09-25 PROCEDURE — 99213 OFFICE O/P EST LOW 20 MIN: CPT | Performed by: STUDENT IN AN ORGANIZED HEALTH CARE EDUCATION/TRAINING PROGRAM

## 2020-09-25 RX ORDER — ASPIRIN 81 MG/1
81 TABLET, CHEWABLE ORAL DAILY
Qty: 90 TABLET | Refills: 3 | Status: SHIPPED | OUTPATIENT
Start: 2020-09-25

## 2020-09-25 RX ORDER — MELATONIN
1000 DAILY
Qty: 90 TABLET | Refills: 3 | Status: SHIPPED | OUTPATIENT
Start: 2020-09-25

## 2020-09-25 RX ORDER — LISINOPRIL 40 MG/1
TABLET ORAL
Qty: 30 TABLET | Refills: 3 | Status: SHIPPED | OUTPATIENT
Start: 2020-09-25 | End: 2022-01-04 | Stop reason: SDUPTHER

## 2020-09-25 RX ORDER — GLIMEPIRIDE 4 MG/1
TABLET ORAL
Qty: 60 TABLET | Refills: 3 | Status: SHIPPED | OUTPATIENT
Start: 2020-09-25 | End: 2021-01-04 | Stop reason: SDUPTHER

## 2020-09-25 RX ORDER — CARVEDILOL 25 MG/1
TABLET ORAL
Qty: 60 TABLET | Refills: 3 | Status: SHIPPED | OUTPATIENT
Start: 2020-09-25 | End: 2021-03-30

## 2020-09-25 RX ORDER — EZETIMIBE 10 MG/1
TABLET ORAL
Qty: 30 TABLET | Refills: 3 | Status: SHIPPED | OUTPATIENT
Start: 2020-09-25 | End: 2021-03-30

## 2020-09-25 RX ORDER — ATORVASTATIN CALCIUM 40 MG/1
40 TABLET, FILM COATED ORAL DAILY
Qty: 30 TABLET | Refills: 3 | Status: SHIPPED | OUTPATIENT
Start: 2020-09-25 | End: 2021-12-30 | Stop reason: SDUPTHER

## 2020-09-25 RX ORDER — AMLODIPINE BESYLATE 10 MG/1
TABLET ORAL
Qty: 30 TABLET | Refills: 3 | Status: SHIPPED | OUTPATIENT
Start: 2020-09-25 | End: 2021-05-27 | Stop reason: SDUPTHER

## 2020-09-25 RX ORDER — FUROSEMIDE 40 MG/1
TABLET ORAL
Qty: 30 TABLET | Refills: 3 | Status: SHIPPED | OUTPATIENT
Start: 2020-09-25 | End: 2021-05-27 | Stop reason: SDUPTHER

## 2020-09-25 ASSESSMENT — ENCOUNTER SYMPTOMS
RESPIRATORY NEGATIVE: 1
SHORTNESS OF BREATH: 0
VOMITING: 0
ABDOMINAL PAIN: 0
BACK PAIN: 0
COUGH: 0
EYES NEGATIVE: 1
STRIDOR: 0
GASTROINTESTINAL NEGATIVE: 1
DIARRHEA: 0
CONSTIPATION: 0
NAUSEA: 0
CHOKING: 0

## 2020-09-25 NOTE — PROGRESS NOTES
37.20 kg/m²     Body mass index is 37.2 kg/m². Wt Readings from Last 3 Encounters:   09/25/20 216 lb 11.2 oz (98.3 kg)   08/13/20 219 lb (99.3 kg)   12/09/19 215 lb 1.6 oz (97.6 kg)     Physical Exam  Constitutional:       General: She is not in acute distress. Appearance: Normal appearance. HENT:      Head: Normocephalic and atraumatic. Eyes:      Pupils: Pupils are equal, round, and reactive to light. Comments: Right eye cataract   Cardiovascular:      Pulses: Normal pulses. Heart sounds: Normal heart sounds. No murmur. No friction rub. No gallop. Pulmonary:      Effort: Pulmonary effort is normal. No respiratory distress. Breath sounds: Normal breath sounds. No stridor. No wheezing or rhonchi. Chest:      Chest wall: No tenderness. Abdominal:      General: There is no distension. Palpations: There is no mass. Tenderness: There is no abdominal tenderness. There is no guarding. Musculoskeletal: Normal range of motion. General: No swelling or tenderness. Right lower leg: No edema. Left lower leg: No edema. Skin:     General: Skin is warm and dry. Capillary Refill: Capillary refill takes less than 2 seconds. Coloration: Skin is not jaundiced. Findings: No bruising. Neurological:      General: No focal deficit present. Mental Status: She is alert and oriented to person, place, and time. Motor: No weakness. Psychiatric:         Mood and Affect: Mood normal.         Thought Content: Thought content normal.       Health Maintenance:   Immunizations: refused     Assessment/Plan:   Tequila Grandchild was seen today for check-up.     Diagnoses and all orders for this visit:    Type 2 diabetes mellitus with complication, without long-term current use of insulin (HCC)  - last HgbA1c 7.5  - will not start metformin given CKD3b and risk of developing lactic acidosis if starting metformin  -     glimepiride (AMARYL) 4 MG tablet; TAKE TWO TABLETS BY

## 2021-01-04 DIAGNOSIS — E11.8 TYPE 2 DIABETES MELLITUS WITH COMPLICATION, WITHOUT LONG-TERM CURRENT USE OF INSULIN (HCC): ICD-10-CM

## 2021-01-04 RX ORDER — GLIMEPIRIDE 4 MG/1
TABLET ORAL
Qty: 60 TABLET | Refills: 3 | Status: SHIPPED
Start: 2021-01-04 | End: 2021-04-27 | Stop reason: ALTCHOICE

## 2021-01-22 DIAGNOSIS — E11.8 TYPE 2 DIABETES MELLITUS WITH COMPLICATION, WITHOUT LONG-TERM CURRENT USE OF INSULIN (HCC): ICD-10-CM

## 2021-03-23 ENCOUNTER — OFFICE VISIT (OUTPATIENT)
Dept: CARDIOLOGY CLINIC | Age: 76
End: 2021-03-23
Payer: MEDICARE

## 2021-03-23 VITALS
TEMPERATURE: 97.5 F | SYSTOLIC BLOOD PRESSURE: 130 MMHG | BODY MASS INDEX: 36.56 KG/M2 | WEIGHT: 213 LBS | HEART RATE: 68 BPM | DIASTOLIC BLOOD PRESSURE: 70 MMHG

## 2021-03-23 DIAGNOSIS — I48.0 PAROXYSMAL ATRIAL FIBRILLATION (HCC): ICD-10-CM

## 2021-03-23 DIAGNOSIS — I10 ESSENTIAL HYPERTENSION: ICD-10-CM

## 2021-03-23 DIAGNOSIS — I50.32 CHRONIC DIASTOLIC CONGESTIVE HEART FAILURE (HCC): Primary | ICD-10-CM

## 2021-03-23 PROCEDURE — 1036F TOBACCO NON-USER: CPT | Performed by: INTERNAL MEDICINE

## 2021-03-23 PROCEDURE — 99214 OFFICE O/P EST MOD 30 MIN: CPT | Performed by: INTERNAL MEDICINE

## 2021-03-23 PROCEDURE — 4040F PNEUMOC VAC/ADMIN/RCVD: CPT | Performed by: INTERNAL MEDICINE

## 2021-03-23 PROCEDURE — G8417 CALC BMI ABV UP PARAM F/U: HCPCS | Performed by: INTERNAL MEDICINE

## 2021-03-23 PROCEDURE — G8427 DOCREV CUR MEDS BY ELIG CLIN: HCPCS | Performed by: INTERNAL MEDICINE

## 2021-03-23 PROCEDURE — G8484 FLU IMMUNIZE NO ADMIN: HCPCS | Performed by: INTERNAL MEDICINE

## 2021-03-23 PROCEDURE — G8400 PT W/DXA NO RESULTS DOC: HCPCS | Performed by: INTERNAL MEDICINE

## 2021-03-23 PROCEDURE — 1123F ACP DISCUSS/DSCN MKR DOCD: CPT | Performed by: INTERNAL MEDICINE

## 2021-03-23 PROCEDURE — 1090F PRES/ABSN URINE INCON ASSESS: CPT | Performed by: INTERNAL MEDICINE

## 2021-03-23 ASSESSMENT — ENCOUNTER SYMPTOMS
SHORTNESS OF BREATH: 0
CHEST TIGHTNESS: 0
CHOKING: 0
COUGH: 0

## 2021-03-23 NOTE — PROGRESS NOTES
Subjective:      Patient ID: Juan Alberto Powers is a 66 y.o. female. Congestive Heart Failure  Pertinent negatives include no chest pain, fatigue, palpitations or shortness of breath. Hypertension  Pertinent negatives include no chest pain, palpitations or shortness of breath. Here for follow up afib/HTN/CHF. No complaints. Feeling good. No sob. No pnd or orthopnea. BP good at home. No tachycardia. Rhythm stable. No pnd or orthopnea. Past Medical History:   Diagnosis Date    Atrial fibrillation (HCC)     CKD (chronic kidney disease) stage 3, GFR 30-59 ml/min     Diabetes mellitus type II     Diastolic CHF, chronic (HCC)     HTN (hypertension)     Hyperlipidemia     Tobacco abuse      No past surgical history on file. Social History     Socioeconomic History    Marital status:      Spouse name: Not on file    Number of children: Not on file    Years of education: Not on file    Highest education level: Not on file   Occupational History    Not on file   Social Needs    Financial resource strain: Not on file    Food insecurity     Worry: Not on file     Inability: Not on file    Transportation needs     Medical: Not on file     Non-medical: Not on file   Tobacco Use    Smoking status: Former Smoker     Packs/day: 0.33     Types: Cigarettes    Smokeless tobacco: Never Used    Tobacco comment: Trying to quit   Substance and Sexual Activity    Alcohol use:  Yes     Alcohol/week: 0.0 standard drinks     Comment: about a glass a week, of liquor    Drug use: Not on file    Sexual activity: Not on file   Lifestyle    Physical activity     Days per week: Not on file     Minutes per session: Not on file    Stress: Not on file   Relationships    Social connections     Talks on phone: Not on file     Gets together: Not on file     Attends Protestant service: Not on file     Active member of club or organization: Not on file     Attends meetings of clubs or organizations: Not on file Relationship status: Not on file    Intimate partner violence     Fear of current or ex partner: Not on file     Emotionally abused: Not on file     Physically abused: Not on file     Forced sexual activity: Not on file   Other Topics Concern    Not on file   Social History Narrative    Not on file     FH reviewed, denies FH cardiac issues      Vitals:    03/23/21 1059   BP: 130/70   Pulse: 68   Temp: 97.5 °F (36.4 °C)         wt 213      Review of Systems   Constitutional: Negative for activity change, appetite change and fatigue. Respiratory: Negative for cough, choking, chest tightness and shortness of breath. Cardiovascular: Negative for chest pain, palpitations and leg swelling. Denies PND or orthopnea. No tachycardia or syncope. Neurological: Negative for dizziness, syncope and light-headedness. Psychiatric/Behavioral: Negative for agitation, behavioral problems and confusion. All other systems reviewed negative as done        Objective:   Physical Exam   Constitutional: She is oriented to person, place, and time. She appears well-developed and well-nourished. No distress. HENT:   Head: Normocephalic and atraumatic. Eyes: Conjunctivae and EOM are normal. Right eye exhibits no discharge. Left eye exhibits no discharge. Neck: Normal range of motion. No JVD present. Cardiovascular: Normal rate, regular rhythm, S1 normal, S2 normal and normal heart sounds. Exam reveals no gallop. No murmur heard. Pulses:       Radial pulses are 2+ on the right side and 2+ on the left side. Pulmonary/Chest: Effort normal and breath sounds normal. No respiratory distress. She has no wheezes. She has no rales. Abdominal: Soft. Bowel sounds are normal. There is no abdominal tenderness. Musculoskeletal: Normal range of motion. General: No edema. Neurological: She is alert and oriented to person, place, and time. Skin: Skin is warm and dry.    Psychiatric: She has a normal mood and affect. Her behavior is normal. Thought content normal.       Assessment:       Diagnosis Orders   1. Chronic diastolic congestive heart failure (HCC)     2. Paroxysmal atrial fibrillation ([address]     3. Essential hypertension             Plan:      CV stable. Feeling good. Rhythm stable. BP reasonable at home. Reviewed previous records and testing including echo 8/19. No changes. Continue to monitor. Follow up 6 months.

## 2021-03-29 DIAGNOSIS — I10 ESSENTIAL HYPERTENSION: ICD-10-CM

## 2021-03-29 DIAGNOSIS — E78.00 PURE HYPERCHOLESTEROLEMIA: ICD-10-CM

## 2021-04-01 RX ORDER — EZETIMIBE 10 MG/1
TABLET ORAL
Qty: 30 TABLET | Refills: 1 | Status: SHIPPED | OUTPATIENT
Start: 2021-04-01 | End: 2021-07-13

## 2021-04-01 RX ORDER — CARVEDILOL 25 MG/1
TABLET ORAL
Qty: 60 TABLET | Refills: 1 | Status: SHIPPED | OUTPATIENT
Start: 2021-04-01 | End: 2021-07-13

## 2021-04-27 ENCOUNTER — OFFICE VISIT (OUTPATIENT)
Dept: INTERNAL MEDICINE CLINIC | Age: 76
End: 2021-04-27
Payer: MEDICARE

## 2021-04-27 VITALS
TEMPERATURE: 97.3 F | WEIGHT: 211.8 LBS | HEIGHT: 64 IN | BODY MASS INDEX: 36.16 KG/M2 | HEART RATE: 75 BPM | DIASTOLIC BLOOD PRESSURE: 66 MMHG | OXYGEN SATURATION: 98 % | SYSTOLIC BLOOD PRESSURE: 136 MMHG

## 2021-04-27 DIAGNOSIS — E11.8 TYPE 2 DIABETES MELLITUS WITH COMPLICATION, WITHOUT LONG-TERM CURRENT USE OF INSULIN (HCC): Primary | ICD-10-CM

## 2021-04-27 LAB — HBA1C MFR BLD: 10.9 %

## 2021-04-27 PROCEDURE — 83036 HEMOGLOBIN GLYCOSYLATED A1C: CPT

## 2021-04-27 PROCEDURE — 99213 OFFICE O/P EST LOW 20 MIN: CPT | Performed by: STUDENT IN AN ORGANIZED HEALTH CARE EDUCATION/TRAINING PROGRAM

## 2021-04-27 RX ORDER — DULAGLUTIDE 0.75 MG/.5ML
0.75 INJECTION, SOLUTION SUBCUTANEOUS WEEKLY
Qty: 2 PEN | Refills: 0 | Status: SHIPPED | OUTPATIENT
Start: 2021-04-27 | End: 2021-10-20

## 2021-04-27 NOTE — PROGRESS NOTES
Department Of Internal Medicine  General Medicine/Primary Care  Established Patient Visit    Patient:  Susie Humphries                                               : 1945  Age: 66 y.o. MRN: [id number]  Date : 2021    History Obtained From:  patient    REASON FOR VISIT:  Routine follow-up visits     HISTORY OF PRESENT ILLNESS:   The patient is a 66 y.o. female who presents for routine follow-up visit. Pt states she feels well. She hasn't been out at all for the past year. She reports being compliant with her medications and checks her blood sugars daily, they usually range between 150-170. She also reports measuring her BP once a week. She reports she tried to eat healthy. She denies CP, SOB, palpitations, N/V, abd pain, cough, PND. Past Medical History:        Diagnosis Date    Atrial fibrillation (HCC)     CKD (chronic kidney disease) stage 3, GFR 30-59 ml/min     Diabetes mellitus type II     Diastolic CHF, chronic (HCC)     HTN (hypertension)     Hyperlipidemia     Tobacco abuse        Past Surgical History:    No past surgical history on file. Family History:   No family history on file. Social History:   TOBACCO:   reports that she has quit smoking. Her smoking use included cigarettes. She smoked 0.33 packs per day. She has never used smokeless tobacco.  ETOH:   reports current alcohol use. OCCUPATION:      Allergies:  Patient has no known allergies. Current Medications:    Prior to Admission medications    Medication Sig Start Date End Date Taking? Authorizing Provider   carvedilol (COREG) 25 MG tablet TAKE 1 TABLET BY MOUTH 2 TIMES A DAY WITH MEALS 4/3/04  Yes Hellen Ordaz MD   ezetimibe (ZETIA) 10 MG tablet TAKE 1 TABLET BY MOUTH ONE TIME A DAY 32  Yes Hellen Ordaz MD   blood glucose test strips (ACCU-CHEK MARIANA) strip As needed.  21  Yes Venkat Martínez MD   glimepiride (AMARYL) 4 MG tablet TAKE TWO TABLETS BY MOUTH EVERY MORNING 51  Yes Hellen Ordaz MD amLODIPine (NORVASC) 10 MG tablet TAKE 1 TABLET BY MOUTH ONE TIME A DAY 5/87/45  Yes Gwendolyn Campoverde MD   furosemide (LASIX) 40 MG tablet TAKE 1 TABLET BY MOUTH ONE TIME A DAY 4/45/79  Yes Gwendolyn Campoverde MD   atorvastatin (LIPITOR) 40 MG tablet Take 1 tablet by mouth daily 7/36/12  Yes Gwendolyn Campoverde MD   Calcium-Vitamin D 600-200 MG-UNIT TABS Take 1 tablet by mouth 2 times daily 4/39/81  Yes Gwendolyn Campoverde MD   aspirin (ASPIRIN CHILDRENS) 81 MG chewable tablet Take 1 tablet by mouth daily 4/59/19  Yes Gwendolyn Campoverde MD   vitamin D3 (CHOLECALCIFEROL) 25 MCG (1000 UT) TABS tablet Take 1 tablet by mouth daily 2/25/23  Yes Gwendolyn Campoverde MD   lisinopril (PRINIVIL;ZESTRIL) 40 MG tablet TAKE ONE TABLET BY MOUTH ONE TIME DAILY 5/78/98   Gwendolyn Campoverde MD       ROS: Review of Systems - Per HPI    Physical Exam:      Vitals: /66 (Site: Left Upper Arm, Position: Sitting, Cuff Size: Medium Adult)   Pulse 75   Temp 97.3 °F (36.3 °C) (Temporal)   Ht 5' 4\" (1.626 m)   Wt 211 lb 12.8 oz (96.1 kg)   SpO2 98%   BMI 36.36 kg/m²     Body mass index is 36.36 kg/m². Wt Readings from Last 3 Encounters:   04/27/21 211 lb 12.8 oz (96.1 kg)   03/23/21 213 lb (96.6 kg)   09/25/20 216 lb 11.2 oz (98.3 kg)       Physical Examination:   · General appearance: Appears comfortable at rest, fully alert and orientated    · HEENT: Atraumatic, normocephalic, moist mucus membranes  · Respiratory: Normal respiratory effort. No wheezes, rubs, or crackles  · Cardiovascular: regular S1/S2, with no Murmur, rub or gallop. · Abdomen: Soft, non-tender, non-distended  · Musculoskeletal: No clubbing, cyanosis, no lower extremity edema, peripheral pulses present, cap refill < 2sec  · Neurologic: Neurovascularly grossly intact without any focal motor deficits. Cranial nerves:  grossly non-focal.    LABS:    Chemistry:  No results for input(s): BUN, CREATININE, NA, K, CO2, CL, MG, PHOS, AST, ALT, ALB, PROT in the last 72 hours.     Invalid input(s): GLU, CA, TBILI, DBILI, ALP, GLUFASTING    No results for input(s): ALKPHOS, ALT, AST, PROT, BILITOT, BILIDIR, LABALBU in the last 72 hours. Lab Results   Component Value Date    LABA1C 7.5 08/13/2020     Lab Results   Component Value Date    .6 08/13/2020       No results for input(s): Nelma Spittle, LABMICR, MICROALBUR, Corene Madi in the last 72 hours. No results found for: TSHFT4, TSH    Hematology:  No results for input(s): WBC, HGB, HCT, PLT, MCV, MCH, MCHC, RDW, EOSABS in the last 72 hours. Invalid input(s): NEUTP, LYMPHP, MONOSP, EOSP, BASOP, NEUTABS, LYMPHABS, MONOABS, BASOABS    No results found for: IRON, TIBC, FERRITIN, FOLATE, [id number], PTH    Lipid:  Lab Results   Component Value Date    CHOL 238 (H) 03/19/2018    HDL 86 (H) 08/13/2020    LDLCALC 126 (H) 08/13/2020    TRIG 161 (H) 03/19/2018       U/A:  Lab Results   Component Value Date    LABMICR 88.90 (H) 03/19/2018       Imaging:   No results found. Active Problems:     1. HTN  2. DM2  3. Hypercholesterolemia   4. Diastolic HF   5. Vit D def   6. CKD stage 3     Assessment/Plan:     Gardenia Zazueta is a 66 y.o. female     [place] was seen today for check-up.     Diagnoses and all orders for this visit:     Type 2 diabetes mellitus with complication, without long-term current use of insulin (HCC)  -HbA1c today 10.9 from 7.5 in 8/13/2020  - will not start metformin given CKD3b and risk of developing lactic acidosis if starting metformin  -   Discontinue glimepiride (AMARYL) 4 MG   - start TRULICITY 8.15 MG for 4 weeks, then 1.75 mg weekly   -   blood glucose test strips (ACCU-CHEK MARIANA) strip;  As needed     Essential hypertension  -     amLODIPine (NORVASC) 10 MG tablet; TAKE 1 TABLET BY MOUTH ONE TIME A DAY  -     carvedilol (COREG) 25 MG tablet; TAKE 1 TABLET BY MOUTH 2 TIMES A DAY WITH MEALS  -     furosemide (LASIX) 40 MG tablet; TAKE 1 TABLET BY MOUTH ONE TIME A DAY  -     lisinopril (PRINIVIL;ZESTRIL) 40 MG tablet; TAKE ONE TABLET BY MOUTH ONE TIME DAILY     Pure hypercholesterolemia  -     ezetimibe (ZETIA) 10 MG tablet; TAKE 1 TABLET BY MOUTH ONE TIME A DAY  -     atorvastatin (LIPITOR) 40 MG tablet; Take 1 tablet by mouth daily     Vitamin D deficiency  -     Calcium-Vitamin D 600-200 MG-UNIT TABS; Take 1 tablet by mouth 2 times daily  -     vitamin D3 (CHOLECALCIFEROL) 25 MCG (1000 UT) TABS tablet; Take 1 tablet by mouth daily     Other orders  -     aspirin (ASPIRIN CHILDRENS) 81 MG chewable tablet; Take 1 tablet by mouth daily        Morbid obesity  Obesity (Body mass index is 37) - Complicating assessment and treatment. Placing patient at risk for multiple co-morbidities as well as early death and contributing to the patient's presentation. Discussed weight loss and exercise.     Diastolic CHF, chronic, not in exacerbation  Discussed low-sodium and 2L fluid intake limit diet given she has G2DD.      CKD 3b  -never seen nephrologist, never been on dialysis in the past  Given CKD3b, follow-up with lab work in 6 months, including renal panel and urinary MD/CR    Case discussed with preceptor.   Follow-up in 2 weeks     Bronwyn Altamirano MD   Internal Medicine, PGY1  4/27/2021 4:10 PM  [employer] via [employer]

## 2021-05-20 DIAGNOSIS — I10 ESSENTIAL HYPERTENSION: ICD-10-CM

## 2021-05-26 DIAGNOSIS — I10 ESSENTIAL HYPERTENSION: ICD-10-CM

## 2021-05-27 RX ORDER — FUROSEMIDE 40 MG/1
TABLET ORAL
Qty: 30 TABLET | Refills: 3 | Status: SHIPPED | OUTPATIENT
Start: 2021-05-27 | End: 2021-09-24

## 2021-05-27 RX ORDER — FUROSEMIDE 40 MG/1
TABLET ORAL
Qty: 30 TABLET | Refills: 1 | Status: SHIPPED | OUTPATIENT
Start: 2021-05-27 | End: 2022-06-06 | Stop reason: SDUPTHER

## 2021-05-27 RX ORDER — AMLODIPINE BESYLATE 10 MG/1
TABLET ORAL
Qty: 30 TABLET | Refills: 3 | Status: SHIPPED | OUTPATIENT
Start: 2021-05-27 | End: 2021-09-24

## 2021-05-27 RX ORDER — AMLODIPINE BESYLATE 10 MG/1
TABLET ORAL
Qty: 30 TABLET | Refills: 1 | Status: SHIPPED | OUTPATIENT
Start: 2021-05-27 | End: 2022-03-17

## 2021-07-12 DIAGNOSIS — I10 ESSENTIAL HYPERTENSION: ICD-10-CM

## 2021-07-12 DIAGNOSIS — E78.00 PURE HYPERCHOLESTEROLEMIA: ICD-10-CM

## 2021-07-12 DIAGNOSIS — E11.8 TYPE 2 DIABETES MELLITUS WITH COMPLICATION, WITHOUT LONG-TERM CURRENT USE OF INSULIN (HCC): ICD-10-CM

## 2021-07-13 RX ORDER — CARVEDILOL 25 MG/1
TABLET ORAL
Qty: 60 TABLET | Refills: 2 | Status: SHIPPED | OUTPATIENT
Start: 2021-07-13 | End: 2021-09-17 | Stop reason: SDUPTHER

## 2021-07-13 RX ORDER — GLIMEPIRIDE 4 MG/1
TABLET ORAL
Qty: 60 TABLET | Refills: 3 | Status: SHIPPED | OUTPATIENT
Start: 2021-07-13 | End: 2021-10-27 | Stop reason: SDUPTHER

## 2021-07-13 RX ORDER — EZETIMIBE 10 MG/1
TABLET ORAL
Qty: 30 TABLET | Refills: 2 | Status: SHIPPED | OUTPATIENT
Start: 2021-07-13 | End: 2022-03-17

## 2021-09-13 DIAGNOSIS — I10 ESSENTIAL HYPERTENSION: ICD-10-CM

## 2021-09-13 DIAGNOSIS — E11.8 TYPE 2 DIABETES MELLITUS WITH COMPLICATION, WITHOUT LONG-TERM CURRENT USE OF INSULIN (HCC): ICD-10-CM

## 2021-09-13 RX ORDER — CARVEDILOL 25 MG/1
TABLET ORAL
Qty: 60 TABLET | Refills: 3 | Status: CANCELLED | OUTPATIENT
Start: 2021-09-13

## 2021-09-13 RX ORDER — GLIMEPIRIDE 4 MG/1
TABLET ORAL
Qty: 60 TABLET | Refills: 3 | Status: CANCELLED | OUTPATIENT
Start: 2021-09-13

## 2021-09-16 DIAGNOSIS — E11.8 TYPE 2 DIABETES MELLITUS WITH COMPLICATION, WITHOUT LONG-TERM CURRENT USE OF INSULIN (HCC): ICD-10-CM

## 2021-09-16 DIAGNOSIS — I10 ESSENTIAL HYPERTENSION: ICD-10-CM

## 2021-09-16 RX ORDER — GLIMEPIRIDE 4 MG/1
TABLET ORAL
Qty: 60 TABLET | Refills: 3 | Status: CANCELLED | OUTPATIENT
Start: 2021-09-16

## 2021-09-16 RX ORDER — CARVEDILOL 25 MG/1
TABLET ORAL
Qty: 60 TABLET | Refills: 1 | Status: CANCELLED | OUTPATIENT
Start: 2021-09-16

## 2021-09-16 NOTE — TELEPHONE ENCOUNTER
Last seen 4-27-21  No future appointments set . Missed 5-11-21 appointment . Your note from 4-27-21 said to discontinue the Glimepiride, but it was filled on 7-13-21 NOt sure if you want her on this.

## 2021-09-17 ENCOUNTER — TELEPHONE (OUTPATIENT)
Dept: INTERNAL MEDICINE CLINIC | Age: 76
End: 2021-09-17

## 2021-09-17 DIAGNOSIS — I10 ESSENTIAL HYPERTENSION: ICD-10-CM

## 2021-09-17 RX ORDER — CARVEDILOL 25 MG/1
TABLET ORAL
Qty: 60 TABLET | Refills: 5 | Status: SHIPPED | OUTPATIENT
Start: 2021-09-17 | End: 2022-01-04 | Stop reason: SDUPTHER

## 2021-09-17 NOTE — TELEPHONE ENCOUNTER
Left message for patient to call for appointment. She was due in May for 2 week followup after her medications were changed.

## 2021-09-24 ENCOUNTER — OFFICE VISIT (OUTPATIENT)
Dept: INTERNAL MEDICINE CLINIC | Age: 76
End: 2021-09-24
Payer: MEDICARE

## 2021-09-24 DIAGNOSIS — E55.9 VITAMIN D DEFICIENCY: ICD-10-CM

## 2021-09-24 DIAGNOSIS — E08.22 DIABETES MELLITUS DUE TO UNDERLYING CONDITION WITH STAGE 3 CHRONIC KIDNEY DISEASE, UNSPECIFIED WHETHER LONG TERM INSULIN USE, UNSPECIFIED WHETHER STAGE 3A OR 3B CKD (HCC): ICD-10-CM

## 2021-09-24 DIAGNOSIS — N18.32 STAGE 3B CHRONIC KIDNEY DISEASE (HCC): Primary | ICD-10-CM

## 2021-09-24 DIAGNOSIS — E66.01 SEVERE OBESITY (BMI 35.0-35.9 WITH COMORBIDITY) (HCC): ICD-10-CM

## 2021-09-24 DIAGNOSIS — E78.5 HYPERLIPIDEMIA, UNSPECIFIED HYPERLIPIDEMIA TYPE: ICD-10-CM

## 2021-09-24 DIAGNOSIS — N18.30 DIABETES MELLITUS DUE TO UNDERLYING CONDITION WITH STAGE 3 CHRONIC KIDNEY DISEASE, UNSPECIFIED WHETHER LONG TERM INSULIN USE, UNSPECIFIED WHETHER STAGE 3A OR 3B CKD (HCC): ICD-10-CM

## 2021-09-24 LAB — HBA1C MFR BLD: 14 %

## 2021-09-24 PROCEDURE — 99213 OFFICE O/P EST LOW 20 MIN: CPT | Performed by: PHYSICIAN ASSISTANT

## 2021-09-24 PROCEDURE — 83036 HEMOGLOBIN GLYCOSYLATED A1C: CPT

## 2021-09-24 NOTE — PROGRESS NOTES
Department Of Internal Medicine  General Medicine/Primary Care  Established Patient Visit    Patient:  Meena Aguayo                                               : 1945  Age: 66 y.o. MRN: [id number]  Date : 2021    History Obtained From:  patient    REASON FOR VISIT:  Routine follow-up visits     HISTORY OF PRESENT ILLNESS:   Patient is a 66 y.o. female who presents for routine follow-up visit. She was last seen in the clinic on 21. Since that visit patient has not been compliant with her recommended diet or recent addition of Trulicity. patient states she does not like needles and refused to take it. She reports her fasting sugars over the last month has been 300-600. She denies CP, SOB, palpitations, N/V, abd pain, cough, PND. Past Medical History:        Diagnosis Date    Atrial fibrillation (HCC)     CKD (chronic kidney disease) stage 3, GFR 30-59 ml/min (HCC)     Diabetes mellitus type II     Diastolic CHF, chronic (HCC)     HTN (hypertension)     Hyperlipidemia     Tobacco abuse        Past Surgical History:    No past surgical history on file. Family History:   No family history on file. Social History:   TOBACCO:   reports that she has quit smoking. Her smoking use included cigarettes. She smoked 0.33 packs per day. She has never used smokeless tobacco.  ETOH:   reports current alcohol use. OCCUPATION:      Allergies:  Patient has no known allergies. Current Medications:    Prior to Admission medications    Medication Sig Start Date End Date Taking?  Authorizing Provider   carvedilol (COREG) 25 MG tablet take 1 tablet by mouth 2 times a day with meals 21   Ariel Ortiz MD   glimepiride (AMARYL) 4 MG tablet TAKE TWO TABLETS BY MOUTH EVERY MORNING 21   Ariel Ortiz MD   ezetimibe (ZETIA) 10 MG tablet TAKE 1 TABLET BY MOUTH ONE TIME A DAY 21   Ariel Ortiz MD   amLODIPine (NORVASC) 10 MG tablet TAKE 1 TABLET BY MOUTH ONE TIME A DAY 21   Ariel Ortiz MD furosemide (LASIX) 40 MG tablet TAKE 1 TABLET BY MOUTH ONE TIME A DAY 5/27/21   Haylee Waldrop MD   amLODIPine (NORVASC) 10 MG tablet TAKE 1 TABLET BY MOUTH ONE TIME A DAY 5/27/21   Haylee Waldrop MD   furosemide (LASIX) 40 MG tablet TAKE 1 TABLET BY MOUTH ONE TIME A DAY 5/27/21   Haylee Waldrop MD   Dulaglutide (TRULICITY) 8.41 UG/5.3XY SOPN Inject 0.75 mg into the skin once a week 4/27/21   Haylee Waldrop MD   blood glucose test strips (ACCU-CHEK MARIANA) strip As needed. 1/22/21   Yamila Maldonado MD   atorvastatin (LIPITOR) 40 MG tablet Take 1 tablet by mouth daily 7/62/81   Tahira Beavers MD   lisinopril (PRINIVIL;ZESTRIL) 40 MG tablet TAKE ONE TABLET BY MOUTH ONE TIME DAILY 2/60/51   Tahira Beavers MD   Calcium-Vitamin D 600-200 MG-UNIT TABS Take 1 tablet by mouth 2 times daily 1/11/48   Tahira Beavers MD   aspirin (ASPIRIN CHILDRENS) 81 MG chewable tablet Take 1 tablet by mouth daily 8/27/94   Tahira Beavers MD   vitamin D3 (CHOLECALCIFEROL) 25 MCG (1000 UT) TABS tablet Take 1 tablet by mouth daily 4/14/56   Tahira Beavers MD       ROS: Review of Systems - Per HPI    Physical Exam:      Vitals: There were no vitals taken for this visit. There is no height or weight on file to calculate BMI. Wt Readings from Last 3 Encounters:   04/27/21 211 lb 12.8 oz (96.1 kg)   03/23/21 213 lb (96.6 kg)   09/25/20 216 lb 11.2 oz (98.3 kg)       Physical Examination:   General appearance: Appears comfortable at rest, fully alert and orientated    HEENT: Blind in right eye. Atraumatic, normocephalic, moist mucus membranes  Respiratory: Normal respiratory effort. No wheezes, rubs, or crackles  Cardiovascular: regular S1/S2, with no Murmur, rub or gallop. Abdomen: Soft, non-tender, non-distended  Musculoskeletal: No clubbing, cyanosis, no lower extremity edema, peripheral pulses present, cap refill < 2sec  Neurologic: Neurovascularly grossly intact without any focal motor deficits.  Cranial nerves:  grossly non-focal.    LABS:    Chemistry:  No results for input(s): BUN, CREATININE, NA, K, CO2, CL, MG, PHOS, AST, ALT, ALB, PROT in the last 72 hours. Invalid input(s): GLU, CA, TBILI, DBILI, ALP, GLUFASTING    No results for input(s): ALKPHOS, ALT, AST, PROT, BILITOT, BILIDIR, LABALBU in the last 72 hours. Lab Results   Component Value Date    LABA1C 10.9 04/27/2021     Lab Results   Component Value Date    .6 08/13/2020       No results for input(s): Bertha Croft, LABMICR, MICROALBUR, Lavena Lapping in the last 72 hours. No results found for: TSHFT4, TSH    Hematology:  No results for input(s): WBC, HGB, HCT, PLT, MCV, MCH, MCHC, RDW, EOSABS in the last 72 hours. Invalid input(s): NEUTP, LYMPHP, MONOSP, EOSP, BASOP, NEUTABS, LYMPHABS, MONOABS, BASOABS    No results found for: IRON, TIBC, FERRITIN, FOLATE, [id number], PTH    Lipid:  Lab Results   Component Value Date    CHOL 238 (H) 03/19/2018    HDL 86 (H) 08/13/2020    LDLCALC 126 (H) 08/13/2020    TRIG 161 (H) 03/19/2018       U/A:  Lab Results   Component Value Date    LABMICR 88.90 (H) 03/19/2018       Imaging:   No results found. Active Problems:     1. HTN  2. DM2  3. Hypercholesterolemia   4. Diastolic HF   5. Vit D def   6. CKD stage 3     Assessment/Plan:     Tyrel Styles is a 66 y.o. female     Karron Kelly was seen today for check-up. Diagnoses and all orders for this visit:     Type 2 diabetes mellitus with complication, without long-term current use of insulin (HCC)  - HbA1c today was 14.0. (10.9 in 4/27/21 and 7.5 in 0/46/0670)  - Start Trulicity and Farxiga 5 mg QD.  Continue Gimepride  - Continue to monitor diet and make better diet choices as discussed in your visit  -referral to dietician    Essential hypertension  - continues to be well controlled on current regimen  - will continue Amlodipine 10 mg QD, Coreg 25 mg BID, Furosemide 40 mg  QD, Lisinopril 40 mg QD  - orders for RFP to monitor electrolytes Hyperlipidemia  - lipid panel in 8/2020 reviewed: Tot Chol 232, , TG 86  - continue Ezetimibe 10 mg QD, Lipitor 40 mg QD  - will repeat lipid panel     Vitamin D deficiency  - VitD 8/2020 20.4  - continue Calcium-Vitamin D 600-200 MG-UNIT and CHOLECALCIFEROL 25 MCG QD  - will repeat VitD level     Morbid obesity  Obesity (Body mass index is 37) - Complicating assessment and treatment. Placing patient at risk for multiple co-morbidities as well as early death and contributing to the patient's presentation. Discussed weight loss and exercise. Diastolic CHF, chronic, not in exacerbation  - Continue with low-sodium and 2L fluid intake limit diet given she has G2DD. - continue home Lasix 40 mg daily     CKD IIIB  -Cre 1.8 in 8/2020  - will recheck RFP and Urine protein/Cr     Case discussed with Dr. Tobin Mueller in 4 weeks    Pamela Doll MD  Internal Medicine, PGY2  9/24/2021 1:25 PM  [employer] via [employer]     Addendum to Resident H& P/Progress note:  I have personally seen,examined and evaluated the patient.  I have reviewed the current history, physical findings, labs and assessment and plan and agree with note as documented by resident MD ( Trinh Nunez)      Vincenzo Zuniga MD, [address]

## 2021-09-24 NOTE — PATIENT INSTRUCTIONS
Start Farxiga 5 mg daily  Educated on diet.  Referral to dietician  Start Trulicity today  Continue to log blood sugar daily and bring log to next appointment  RTC in 1 month

## 2021-10-06 ENCOUNTER — OFFICE VISIT (OUTPATIENT)
Dept: CARDIOLOGY CLINIC | Age: 76
End: 2021-10-06
Payer: MEDICARE

## 2021-10-06 VITALS
SYSTOLIC BLOOD PRESSURE: 130 MMHG | DIASTOLIC BLOOD PRESSURE: 80 MMHG | BODY MASS INDEX: 33.99 KG/M2 | HEART RATE: 84 BPM | WEIGHT: 198 LBS

## 2021-10-06 DIAGNOSIS — I10 PRIMARY HYPERTENSION: ICD-10-CM

## 2021-10-06 DIAGNOSIS — I48.0 PAROXYSMAL ATRIAL FIBRILLATION (HCC): ICD-10-CM

## 2021-10-06 DIAGNOSIS — I50.32 CHRONIC DIASTOLIC CONGESTIVE HEART FAILURE (HCC): Primary | ICD-10-CM

## 2021-10-06 PROCEDURE — 4040F PNEUMOC VAC/ADMIN/RCVD: CPT | Performed by: INTERNAL MEDICINE

## 2021-10-06 PROCEDURE — 99214 OFFICE O/P EST MOD 30 MIN: CPT | Performed by: INTERNAL MEDICINE

## 2021-10-06 PROCEDURE — 1036F TOBACCO NON-USER: CPT | Performed by: INTERNAL MEDICINE

## 2021-10-06 PROCEDURE — G8417 CALC BMI ABV UP PARAM F/U: HCPCS | Performed by: INTERNAL MEDICINE

## 2021-10-06 PROCEDURE — 1090F PRES/ABSN URINE INCON ASSESS: CPT | Performed by: INTERNAL MEDICINE

## 2021-10-06 PROCEDURE — G8427 DOCREV CUR MEDS BY ELIG CLIN: HCPCS | Performed by: INTERNAL MEDICINE

## 2021-10-06 PROCEDURE — G8484 FLU IMMUNIZE NO ADMIN: HCPCS | Performed by: INTERNAL MEDICINE

## 2021-10-06 PROCEDURE — G8400 PT W/DXA NO RESULTS DOC: HCPCS | Performed by: INTERNAL MEDICINE

## 2021-10-06 PROCEDURE — 1123F ACP DISCUSS/DSCN MKR DOCD: CPT | Performed by: INTERNAL MEDICINE

## 2021-10-06 ASSESSMENT — ENCOUNTER SYMPTOMS
SHORTNESS OF BREATH: 0
CHOKING: 0
COUGH: 0
CHEST TIGHTNESS: 0

## 2021-10-06 NOTE — PROGRESS NOTES
and Family:     Frequency of Social Gatherings with Friends and Family:     Attends Zoroastrianism Services:     Active Member of Clubs or Organizations:     Attends Club or Organization Meetings:     Marital Status:    Intimate Partner Violence:     Fear of Current or Ex-Partner:     Emotionally Abused:     Physically Abused:     Sexually Abused:      FH reviewed, denies FH cardiac issues      Vitals:    10/06/21 1519   BP: 130/80   Pulse: 84         Wt 198 (down 15 lbs)      Review of Systems   Constitutional: Negative for activity change, appetite change and fatigue. Respiratory: Negative for cough, choking, chest tightness and shortness of breath. Cardiovascular: Negative for chest pain, palpitations and leg swelling. Denies PND or orthopnea. No tachycardia or syncope. Neurological: Negative for dizziness, syncope and light-headedness. Psychiatric/Behavioral: Negative for agitation, behavioral problems and confusion. All other systems reviewed negative as done        Objective:   Physical Exam  Constitutional:       General: She is not in acute distress. Appearance: She is well-developed. HENT:      Head: Normocephalic and atraumatic. Eyes:      General:         Right eye: No discharge. Left eye: No discharge. Conjunctiva/sclera: Conjunctivae normal.   Neck:      Vascular: No JVD. Cardiovascular:      Rate and Rhythm: Normal rate and regular rhythm. Pulses:           Radial pulses are 2+ on the right side and 2+ on the left side. Heart sounds: Normal heart sounds, S1 normal and S2 normal. No murmur heard. No gallop. Pulmonary:      Effort: Pulmonary effort is normal. No respiratory distress. Breath sounds: Normal breath sounds. No wheezing or rales. Abdominal:      General: Bowel sounds are normal.      Palpations: Abdomen is soft. Tenderness: There is no abdominal tenderness. Musculoskeletal:         General: Normal range of motion. Cervical back: Normal range of motion. Skin:     General: Skin is warm and dry. Neurological:      Mental Status: She is alert and oriented to person, place, and time. Psychiatric:         Behavior: Behavior normal.         Thought Content: Thought content normal.         Assessment:       Diagnosis Orders   1. Chronic diastolic congestive heart failure (HCC)     2. Paroxysmal atrial fibrillation ([facility] [address]     3. Primary hypertension             Plan:      CV stable. Feeling good. Rhythm stable. BP reasonable at home. Will continue coreg/norvasc/lasix/lisinopril for CHF/HTN. Coreg for HR control for afib. Wt down. Reviewed previous records and testing including echo 8/19. No changes. Continue to monitor. Follow up 6 months.

## 2021-10-20 RX ORDER — DULAGLUTIDE 0.75 MG/.5ML
INJECTION, SOLUTION SUBCUTANEOUS
Qty: 2 ML | Refills: 0 | Status: SHIPPED | OUTPATIENT
Start: 2021-10-20 | End: 2021-10-27 | Stop reason: ALTCHOICE

## 2021-10-27 ENCOUNTER — OFFICE VISIT (OUTPATIENT)
Dept: INTERNAL MEDICINE CLINIC | Age: 76
End: 2021-10-27
Payer: MEDICARE

## 2021-10-27 VITALS
TEMPERATURE: 97 F | OXYGEN SATURATION: 98 % | HEIGHT: 64 IN | HEART RATE: 89 BPM | SYSTOLIC BLOOD PRESSURE: 166 MMHG | DIASTOLIC BLOOD PRESSURE: 90 MMHG | BODY MASS INDEX: 33.86 KG/M2 | WEIGHT: 198.3 LBS

## 2021-10-27 DIAGNOSIS — E11.8 TYPE 2 DIABETES MELLITUS WITH COMPLICATION, WITHOUT LONG-TERM CURRENT USE OF INSULIN (HCC): Primary | ICD-10-CM

## 2021-10-27 DIAGNOSIS — I10 PRIMARY HYPERTENSION: Chronic | ICD-10-CM

## 2021-10-27 DIAGNOSIS — H54.50 BLINDNESS OF RIGHT EYE WITH LOW VISION IN CONTRALATERAL EYE: ICD-10-CM

## 2021-10-27 DIAGNOSIS — H54.40 BLINDNESS OF RIGHT EYE WITH LOW VISION IN CONTRALATERAL EYE: ICD-10-CM

## 2021-10-27 PROCEDURE — 99213 OFFICE O/P EST LOW 20 MIN: CPT | Performed by: STUDENT IN AN ORGANIZED HEALTH CARE EDUCATION/TRAINING PROGRAM

## 2021-10-27 RX ORDER — GLIMEPIRIDE 4 MG/1
4 TABLET ORAL
Qty: 60 TABLET | Refills: 3 | Status: SHIPPED | OUTPATIENT
Start: 2021-10-27 | End: 2022-06-07

## 2021-10-27 RX ORDER — DULAGLUTIDE 1.5 MG/.5ML
1.5 INJECTION, SOLUTION SUBCUTANEOUS WEEKLY
Qty: 6 ML | Refills: 3 | Status: SHIPPED | OUTPATIENT
Start: 2021-10-27 | End: 2022-02-16 | Stop reason: SDUPTHER

## 2021-10-27 ASSESSMENT — ENCOUNTER SYMPTOMS
ABDOMINAL DISTENTION: 0
SHORTNESS OF BREATH: 0
COUGH: 0
DIARRHEA: 0
ABDOMINAL PAIN: 0
VOMITING: 0
NAUSEA: 0
WHEEZING: 0

## 2021-10-27 NOTE — PROGRESS NOTES
10/27/2021    Vic Atkinson (:  1945) is a 66 y.o. female, here for a preventive medicine evaluation. Patient is here for 1 month follow-up for diabetes. Since her last visit she has been taking her medications daily and weekly as prescribed. She has been improving her diet as discussed in clinic. She has been losing weight significantly. She continues to have high blood sugar at home to the 300s but is improving from 600s. Denies any other complaints. Patient Active Problem List   Diagnosis    Diabetes mellitus ([facility] [address]    HTN (hypertension)    HLD (hyperlipidemia)    Paroxysmal A-fib (HCC)    Hypovitaminosis D    Diastolic CHF, chronic (HCC)    Depression    Pre-syncope    PAT (acute kidney injury) ([facility]       Review of Systems   Constitutional: Negative for chills, fatigue and fever. HENT: Negative for congestion. Respiratory: Negative for cough, shortness of breath and wheezing. Cardiovascular: Negative for chest pain and palpitations. Gastrointestinal: Negative for abdominal distention, abdominal pain, diarrhea, nausea and vomiting. Genitourinary: Negative for dysuria. Neurological: Negative for dizziness, syncope, weakness, light-headedness and numbness. Prior to Visit Medications    Medication Sig Taking?  Authorizing Provider   Dulaglutide (TRULICITY) 1.5 KS/9.6YW SOPN Inject 1.5 mg into the skin once a week Yes Juan M Brantley MD   glimepiride (AMARYL) 4 MG tablet Take 1 tablet by mouth every morning (before breakfast) Yes Juan M Brantley MD   [facility]ap [facility] by Does not apply route Start date: 10/27/21  End date 10/26/26 Yes Juan M Brantley MD   dapagliflozin (FARXIGA) 5 MG tablet Take 1 tablet by mouth every morning Yes Ju Villalobos MD   carvedilol (COREG) 25 MG tablet take 1 tablet by mouth 2 times a day with meals Yes Severiano Coyer, MD   ezetimibe (ZETIA) 10 MG tablet TAKE 1 TABLET BY MOUTH ONE TIME A DAY Yes Severiano Coyer, MD   amLODIPine (Kymberly Chalet) 10 MG tablet TAKE 1 TABLET BY MOUTH ONE TIME A DAY Yes Devi Wells MD   furosemide (LASIX) 40 MG tablet TAKE 1 TABLET BY MOUTH ONE TIME A DAY Yes Devi Wells MD   blood glucose test strips (ACCU-CHEK MARIANA) strip As needed. Yes Desiree Obrien MD   atorvastatin (LIPITOR) 40 MG tablet Take 1 tablet by mouth daily Yes Rebeca Vergara MD   lisinopril (PRINIVIL;ZESTRIL) 40 MG tablet TAKE ONE TABLET BY MOUTH ONE TIME DAILY Yes Rebeca Vergara MD   Calcium-Vitamin D 600-200 MG-UNIT TABS Take 1 tablet by mouth 2 times daily Yes Rebeca Vergara MD   aspirin (ASPIRIN CHILDRENS) 81 MG chewable tablet Take 1 tablet by mouth daily Yes Rebeca Vergara MD   vitamin D3 (CHOLECALCIFEROL) 25 MCG (1000 UT) TABS tablet Take 1 tablet by mouth daily Yes Rebeca Vergara MD        No Known Allergies    Past Medical History:   Diagnosis Date    Atrial fibrillation ([facility] [address]     CKD (chronic kidney disease) stage 3, GFR 30-59 ml/min ([facility])     Diabetes mellitus type II     Diastolic CHF, chronic ([facility])     HTN (hypertension)     Hyperlipidemia     Tobacco abuse        No past surgical history on file.     Social History     Socioeconomic History    Marital status:      Spouse name: Not on file    Number of children: Not on file    Years of education: Not on file    Highest education level: Not on file   Occupational History    Not on file   Tobacco Use    Smoking status: Former Smoker     Packs/day: 0.33     Years: 20.00     Pack years: 6.60     Types: Cigarettes     Quit date: 2016     Years since quittin.8    Smokeless tobacco: Current User     Types: Snuff, Chew    Tobacco comment: Trying to quit   Substance and Sexual Activity    Alcohol use: Not Currently     Comment: about a glass a week, of liquor    Drug use: Never    Sexual activity: Not on file   Other Topics Concern    Not on file   Social History Narrative    Not on file     Social Determinants of Health     Financial Resource Strain:     Difficulty of Paying Living Expenses:    Food Insecurity:     Worried About [address] in the Last Year:     [address] in the Last Year:    Transportation Needs:     Lack of Transportation (Medical):  Lack of Transportation (Non-Medical):    Physical Activity:     Days of Exercise per Week:     Minutes of Exercise per Session:    Stress:     Feeling of Stress :    Social Connections:     Frequency of Communication with Friends and Family:     Frequency of Social Gatherings with Friends and Family:     Attends Confucianist Services:     Active Member of Clubs or Organizations:     Attends Club or Organization Meetings:     Marital Status:    Intimate Partner Violence:     Fear of Current or Ex-Partner:     Emotionally Abused:     Physically Abused:     Sexually Abused:         No family history on file. ADVANCE DIRECTIVE: N, <no information>    Vitals:    10/27/21 1100 10/27/21 1105   BP: (!) 173/103 (!) 166/90   Site: Right Upper Arm Left Upper Arm   Position: Sitting Sitting   Cuff Size: Medium Adult Medium Adult   Pulse: 92 89   Temp: 97 °F (36.1 °C)    TempSrc: Temporal    SpO2: 99% 98%   Weight: 198 lb 4.8 oz (89.9 kg)    Height: 5' 4\" (1.626 m)      Estimated body mass index is 34.04 kg/m² as calculated from the following:    Height as of this encounter: 5' 4\" (1.626 m). Weight as of this encounter: 198 lb 4.8 oz (89.9 kg). Physical Exam  Constitutional:       General: She is not in acute distress. Appearance: She is well-developed. She is not diaphoretic. HENT:      Head: Normocephalic and atraumatic. Cardiovascular:      Rate and Rhythm: Normal rate and regular rhythm. Heart sounds: Normal heart sounds. No murmur heard. Pulmonary:      Effort: Pulmonary effort is normal. No respiratory distress. Breath sounds: Normal breath sounds. No wheezing. Abdominal:      General: Bowel sounds are normal. There is no distension. Palpations: Abdomen is soft. Tenderness: There is no abdominal tenderness. Skin:     General: Skin is warm and dry. Capillary Refill: Capillary refill takes less than 2 seconds. Findings: No erythema. Neurological:      Mental Status: She is alert and oriented to person, place, and time. Psychiatric:         Behavior: Behavior normal.         No flowsheet data found. Lab Results   Component Value Date    CHOL 230 10/04/2021    CHOL 238 03/19/2018    CHOL 258 05/08/2017    CHOLFAST 232 08/13/2020    CHOLFAST 222 03/08/2019    TRIG 104 10/04/2021    TRIG 161 03/19/2018    TRIG 92 05/08/2017    TRIGLYCFAST 98 08/13/2020    TRIGLYCFAST 109 03/08/2019    HDL 68 10/04/2021    HDL 86 08/13/2020    HDL 73 03/08/2019    LDLCALC 141 10/04/2021    LDLCALC 126 08/13/2020    LDLCALC 127 03/08/2019    GLUCOSE 493 10/04/2021    LABA1C 14.0 09/24/2021    LABA1C 10.9 04/27/2021    LABA1C 7.5 08/13/2020       The 10-year ASCVD risk score (Aundra Denver., et al., 2013) is: 54.9%    Values used to calculate the score:      Age: 66 years      Sex: Female      Is Non- : Yes      Diabetic: Yes      Tobacco smoker: No      Systolic Blood Pressure: 372 mmHg      Is BP treated: Yes      HDL Cholesterol: 68 mg/dL      Total Cholesterol: 230 mg/dL      There is no immunization history on file for this patient. Health Maintenance   Topic Date Due    Hepatitis C screen  Never done    COVID-19 Vaccine (1) Never done    DTaP/Tdap/Td vaccine (1 - Tdap) Never done    Shingles Vaccine (1 of 2) Never done    DEXA (modify frequency per FRAX score)  Never done    Pneumococcal 65+ years Vaccine (1 of 1 - PPSV23) Never done   [employer] Visit (AWV)  Never done    Flu vaccine (1) Never done    Lipid screen  10/04/2022    Potassium monitoring  10/04/2022    Creatinine monitoring  10/04/2022    Hepatitis A vaccine  Aged Out    Hib vaccine  Aged Out    Meningococcal (ACWY) vaccine  Aged Out          ASSESSMENT/PLAN:  1.  Type 2 diabetes mellitus with complication, without long-term current use of insulin (HCC)  - Will increase trulicity to 8.0ZB weekly  -     Dulaglutide (TRULICITY) 1.5 DB/2.4CY SOPN; Inject 1.5 mg into the skin once a week, Disp-6 mL, R-3Normal  -     glimepiride (AMARYL) 4 MG tablet; Take 1 tablet by mouth every morning (before breakfast), Disp-60 tablet, R-3Normal  2. Primary hypertension  - Generally well controlled. Elevated today in clinic. Her home cuff usually reads 482K systolic.   - Will continue with current medications and recheck at next visit. 3. Blindness of right eye with low vision in contralateral eye  -     Handicap Placard MISC; Starting Wed 10/27/2021, Disp-1 each, R-0, PrintStart date: 10/27/21 End date 10/26/26      Return in about 1 month (around 11/27/2021), or if symptoms worsen or fail to improve, for Office follow-up. An electronic signature was used to authenticate this note.     --Doug Bolden MD on 10/27/2021 at 11:54 AM

## 2021-10-27 NOTE — PATIENT INSTRUCTIONS
Start taking 2.6AV Trulicity weekly. You can use double of your dose you have now until you run out and then refill with the new dose.

## 2021-12-30 ENCOUNTER — OFFICE VISIT (OUTPATIENT)
Dept: INTERNAL MEDICINE CLINIC | Age: 76
End: 2021-12-30
Payer: MEDICARE

## 2021-12-30 VITALS
OXYGEN SATURATION: 100 % | RESPIRATION RATE: 18 BRPM | TEMPERATURE: 97 F | HEART RATE: 88 BPM | DIASTOLIC BLOOD PRESSURE: 84 MMHG | WEIGHT: 186.7 LBS | BODY MASS INDEX: 32.05 KG/M2 | SYSTOLIC BLOOD PRESSURE: 150 MMHG

## 2021-12-30 DIAGNOSIS — E11.8 TYPE 2 DIABETES MELLITUS WITH COMPLICATION, WITHOUT LONG-TERM CURRENT USE OF INSULIN (HCC): Primary | ICD-10-CM

## 2021-12-30 DIAGNOSIS — E78.00 PURE HYPERCHOLESTEROLEMIA: ICD-10-CM

## 2021-12-30 LAB — HBA1C MFR BLD: 14 %

## 2021-12-30 PROCEDURE — 83036 HEMOGLOBIN GLYCOSYLATED A1C: CPT

## 2021-12-30 PROCEDURE — 99213 OFFICE O/P EST LOW 20 MIN: CPT | Performed by: PHYSICIAN ASSISTANT

## 2021-12-30 RX ORDER — INSULIN GLARGINE 100 [IU]/ML
20 INJECTION, SOLUTION SUBCUTANEOUS NIGHTLY
Qty: 5 PEN | Refills: 0 | Status: SHIPPED | OUTPATIENT
Start: 2021-12-30 | End: 2022-02-16 | Stop reason: SDUPTHER

## 2021-12-30 RX ORDER — ATORVASTATIN CALCIUM 40 MG/1
40 TABLET, FILM COATED ORAL DAILY
Qty: 30 TABLET | Refills: 3 | Status: SHIPPED | OUTPATIENT
Start: 2021-12-30 | End: 2022-04-06 | Stop reason: SDUPTHER

## 2021-12-30 NOTE — PROGRESS NOTES
Department Of Internal Medicine  General Medicine/Primary Care  Established Patient Visit    Patient:  Micheline Primrose                                               : 1945  Age: 64 y.o. MRN: [id number]  Date : 2021    History Obtained From:  patient    REASON FOR VISIT:  Routine follow-up visits     HISTORY OF PRESENT ILLNESS:   Patient is a 64 y.o. female who presents for routine follow-up visit. She was last seen in the clinic on 10/27/21. She states she has been watching her diet and has cut out alot of her sugar, pop, and fried foods. She states her sugasr continue to run from 300-500s. HgbA1c in the clinic today is >14.0, She states she has been complaint with her medications and is very emotional that her A1c is still so high. Her daughter recently moved out of her house and she states the patient is not active in her home spending most time in either her bed or chair. The patient also reports decreased mood. When asked if she is depressed she answered \"sometimes\". She denies any SI/HI. She denies CP, SOB, palpitations, N/V, abd pain, cough, PND. Past Medical History:        Diagnosis Date    Atrial fibrillation (HCC)     CKD (chronic kidney disease) stage 3, GFR 30-59 ml/min (HCC)     Diabetes mellitus type II     Diastolic CHF, chronic (HCC)     HTN (hypertension)     Hyperlipidemia     Tobacco abuse        Past Surgical History:    No past surgical history on file. Family History:   No family history on file. Social History:   TOBACCO:   reports that she quit smoking about 6 years ago. Her smoking use included cigarettes. She has a 6.60 pack-year smoking history. Her smokeless tobacco use includes snuff and chew. ETOH:   reports previous alcohol use. OCCUPATION:      Allergies:  Patient has no known allergies. Current Medications:    Prior to Admission medications    Medication Sig Start Date End Date Taking?  Authorizing Provider   Dulaglutide (TRULICITY) 1.5 CH/1.7CY SOPN Inject 1.5 mg into the skin once a week 10/27/21   Marimar Browne MD   glimepiride (AMARYL) 4 MG tablet Take 1 tablet by mouth every morning (before breakfast) 10/27/21   Marimar Browne MD   Ramiro Ellis MISC by Does not apply route Start date: 10/27/21  End date 10/26/26 10/27/21   Marimar Browne MD   dapagliflozin Godinez Blank) 5 MG tablet Take 1 tablet by mouth every morning 10/27/21   Marimar Browne MD   carvedilol (COREG) 25 MG tablet take 1 tablet by mouth 2 times a day with meals 9/17/21   Calixto Stein MD   ezetimibe (ZETIA) 10 MG tablet TAKE 1 TABLET BY MOUTH ONE TIME A DAY 7/13/21   Calixto Stein MD   amLODIPine (NORVASC) 10 MG tablet TAKE 1 TABLET BY MOUTH ONE TIME A DAY 5/27/21   Calixto Stein MD   furosemide (LASIX) 40 MG tablet TAKE 1 TABLET BY MOUTH ONE TIME A DAY 5/27/21   Calixto Stein MD   blood glucose test strips (ACCU-CHEK MARIANA) strip As needed. 1/22/21   Nicole Browning MD   atorvastatin (LIPITOR) 40 MG tablet Take 1 tablet by mouth daily 1/52/54   Adolfo Hughes MD   lisinopril (PRINIVIL;ZESTRIL) 40 MG tablet TAKE ONE TABLET BY MOUTH ONE TIME DAILY 2/18/44   Adolfo Hughes MD   Calcium-Vitamin D 600-200 MG-UNIT TABS Take 1 tablet by mouth 2 times daily 6/24/59   Adolfo Hughes MD   aspirin (ASPIRIN CHILDRENS) 81 MG chewable tablet Take 1 tablet by mouth daily 0/53/30   Adolfo Hughes MD   vitamin D3 (CHOLECALCIFEROL) 25 MCG (1000 UT) TABS tablet Take 1 tablet by mouth daily 9/53/72   Adolfo Hughes MD       ROS: Review of Systems - Per HPI    Physical Exam:      Vitals: There were no vitals taken for this visit. There is no height or weight on file to calculate BMI. Wt Readings from Last 3 Encounters:   10/27/21 198 lb 4.8 oz (89.9 kg)   10/06/21 198 lb (89.8 kg)   04/27/21 211 lb 12.8 oz (96.1 kg)       Physical Examination:   General appearance: Appears comfortable at rest, fully alert and orientated    HEENT: Blind in right eye.  Atraumatic, normocephalic, moist mucus membranes  Respiratory: Normal respiratory effort. No wheezes, rubs, or crackles  Cardiovascular: regular S1/S2, with no Murmur, rub or gallop. Abdomen: Soft, non-tender, non-distended  Musculoskeletal: No clubbing, cyanosis, no lower extremity edema, peripheral pulses present, cap refill < 2sec  Neurologic: Neurovascularly grossly intact without any focal motor deficits. Cranial nerves:  grossly non-focal.    LABS:    Chemistry:  No results for input(s): BUN, CREATININE, NA, K, CO2, CL, GLU, CA, MG, PHOS, AST, ALT, ALB, PROT in the last 72 hours. Invalid input(s): TBILI, DBILI, ALP, GLUFASTING    No results for input(s): ALKPHOS, ALT, AST, PROT, BILITOT, BILIDIR, LABALBU in the last 72 hours. Lab Results   Component Value Date    LABA1C 14.0 09/24/2021     Lab Results   Component Value Date    .6 08/13/2020       No results for input(s): Faith , LABMICR, MICROALBUR, Jairo Handler in the last 72 hours. No results found for: TSHFT4, TSH    Hematology:  No results for input(s): WBC, HGB, HCT, PLT, MCV, MCH, MCHC, RDW, EOSABS in the last 72 hours. Invalid input(s): NEUTP, LYMPHP, MONOSP, EOSP, BASOP, NEUTABS, LYMPHABS, MONOABS, BASOABS    No results found for: IRON, TIBC, FERRITIN, FOLATE, SRLAFAVK29, PTH    Lipid:  Lab Results   Component Value Date    CHOL 230 (H) 10/04/2021    HDL 68 (H) 10/04/2021    LDLCALC 141 (H) 10/04/2021    TRIG 104 10/04/2021       U/A:  Lab Results   Component Value Date    LABMICR 88.90 (H) 03/19/2018       Imaging:   No results found. Active Problems:     1. HTN  2. DM2  3. Hypercholesterolemia   4. Diastolic HF   5. Vit D def   6. CKD stage 3     Assessment/Plan:     Vitaliy Frankel is a 64 y.o. female     [facility] for check-up.      Diagnoses and all orders for this visit:     Type 2 diabetes mellitus with complication, without long-term current use of insulin (HCC)  - HbA1c today was >14.0. (14.0 in 4/22/22)  - Trulicity increased to 1.5ml weekly  - Continue Amaryl 4mg daily  - increase Farxiga to 10 mg QD  - Start Basaglar 20U at [email or web address]  - Continue to monitor diet and make better diet choices as discussed in your visit  - monitor blood sugar daily and ring log to your next visit    Essential hypertension  - continues to be well controlled on current regimen  - will continue Amlodipine 10 mg QD, Coreg 25 mg BID, Furosemide 40 mg  QD, Lisinopril 40 mg QD     Hyperlipidemia  - lipid panel in 10/2021 reviewed: Tot Chol 230, ,   - continue Ezetimibe 10 mg QD  - Start taking Lipitor 40 mg QD        Vitamin D deficiency  - VitD 10/2021 25.7  - continue Calcium-Vitamin D 600-200 MG-UNIT and CHOLECALCIFEROL 25 MCG QD  - will repeat VitD level     Morbid obesity  Obesity (Body mass index is 37) - Complicating assessment and treatment. Placing patient at risk for multiple co-morbidities as well as early death and contributing to the patient's presentation. Discussed weight loss and exercise. Diastolic CHF, chronic, not in exacerbation  - Continue with low-sodium and 2L fluid intake limit diet given she has G2DD. - continue home Lasix 40 mg daily     CKD IIIB  -Cre 2.2 in 10/2021    Case discussed with Dr. Lazara Zuniga  Follow-up in 1 weeks    Evelyne Fortune MD  Internal Medicine, PGY2  12/30/2021 3:07 PM  Reach via [employer] to Resident H& P/Progress note:  I have personally seen,examined and evaluated the patient.  I have reviewed the current history, physical findings, labs and assessment and plan and agree with note as documented by resident MD ( Rosa Kang)      Sarah Meadows MD, [address]

## 2021-12-30 NOTE — PATIENT INSTRUCTIONS
RTC in 1 week  Start taking Basaglar 20U at HS  Increase Farxiga to 10 mg daily  Start taking Atorvastatin 40 mg daily  Continue to monitor diet

## 2022-01-04 DIAGNOSIS — I10 ESSENTIAL HYPERTENSION: ICD-10-CM

## 2022-01-04 RX ORDER — CARVEDILOL 25 MG/1
TABLET ORAL
Qty: 60 TABLET | Refills: 5 | Status: SHIPPED | OUTPATIENT
Start: 2022-01-04

## 2022-01-04 RX ORDER — LISINOPRIL 40 MG/1
TABLET ORAL
Qty: 30 TABLET | Refills: 3 | Status: SHIPPED | OUTPATIENT
Start: 2022-01-04 | End: 2022-07-28

## 2022-01-04 NOTE — TELEPHONE ENCOUNTER
Carvedilol last prescription was 9-17-21  Last prescription for Lisinopril 9-25-20.      Last seen 12-30-21  Next appointment 1-7-22

## 2022-01-07 ENCOUNTER — TELEPHONE (OUTPATIENT)
Dept: INTERNAL MEDICINE CLINIC | Age: 77
End: 2022-01-07

## 2022-01-07 NOTE — TELEPHONE ENCOUNTER
Due to icy roads in her neighborhood and freezing temp. today patient will reschedule. She states her glucoses now are in 300s ( improved from 500s); she will continue to work on her diet. Instructed patient to call next week if glucoses remain in 300s and make appointment as soon as weather breaks.

## 2022-02-16 ENCOUNTER — OFFICE VISIT (OUTPATIENT)
Dept: INTERNAL MEDICINE CLINIC | Age: 77
End: 2022-02-16
Payer: MEDICARE

## 2022-02-16 VITALS
BODY MASS INDEX: 31.28 KG/M2 | OXYGEN SATURATION: 99 % | SYSTOLIC BLOOD PRESSURE: 127 MMHG | DIASTOLIC BLOOD PRESSURE: 71 MMHG | WEIGHT: 183.2 LBS | HEART RATE: 70 BPM | TEMPERATURE: 96.6 F | HEIGHT: 64 IN

## 2022-02-16 DIAGNOSIS — I48.0 PAROXYSMAL ATRIAL FIBRILLATION (HCC): ICD-10-CM

## 2022-02-16 DIAGNOSIS — I50.32 CHRONIC DIASTOLIC CONGESTIVE HEART FAILURE (HCC): ICD-10-CM

## 2022-02-16 DIAGNOSIS — E11.8 TYPE 2 DIABETES MELLITUS WITH COMPLICATION, WITHOUT LONG-TERM CURRENT USE OF INSULIN (HCC): ICD-10-CM

## 2022-02-16 DIAGNOSIS — E11.9 TYPE 2 DIABETES MELLITUS WITHOUT COMPLICATION, WITHOUT LONG-TERM CURRENT USE OF INSULIN (HCC): Primary | ICD-10-CM

## 2022-02-16 DIAGNOSIS — N18.32 STAGE 3B CHRONIC KIDNEY DISEASE (HCC): ICD-10-CM

## 2022-02-16 LAB — HBA1C MFR BLD: 14 %

## 2022-02-16 PROCEDURE — 83036 HEMOGLOBIN GLYCOSYLATED A1C: CPT

## 2022-02-16 PROCEDURE — 99213 OFFICE O/P EST LOW 20 MIN: CPT | Performed by: STUDENT IN AN ORGANIZED HEALTH CARE EDUCATION/TRAINING PROGRAM

## 2022-02-16 RX ORDER — DULAGLUTIDE 1.5 MG/.5ML
1.5 INJECTION, SOLUTION SUBCUTANEOUS WEEKLY
Qty: 6 ML | Refills: 3 | Status: SHIPPED | OUTPATIENT
Start: 2022-02-16

## 2022-02-16 RX ORDER — INSULIN GLARGINE 100 [IU]/ML
20 INJECTION, SOLUTION SUBCUTANEOUS NIGHTLY
Qty: 5 PEN | Refills: 0 | Status: SHIPPED | OUTPATIENT
Start: 2022-02-16 | End: 2022-04-06 | Stop reason: SDUPTHER

## 2022-02-16 ASSESSMENT — ENCOUNTER SYMPTOMS
DIARRHEA: 0
SHORTNESS OF BREATH: 0
ABDOMINAL DISTENTION: 0
WHEEZING: 0
COUGH: 1
NAUSEA: 0
VOMITING: 0
ABDOMINAL PAIN: 0

## 2022-02-16 NOTE — PROGRESS NOTES
2022    Stephen Hanson (:  1945) is a 64 y.o. female, here for a preventive medicine evaluation. Patient is here for general follow up. She has been adhering to strict diet and been using insulin. Blood glucose continues to be elevated at home with her monitor reading \"sugar too high\". A1c in clinic today continues to be >14.0%. Patient very upset that with interventions her glucose continues to rise. Patient was off Trulicity since starting insulin. She recently had her fridge go out 2 weeks ago and her insulin spoiled. Her furnace also went out about a month ago, and has since been fixed. She has had a cough since that is only at home and is improving as time goes on. Denies any other complaints. Patient Active Problem List   Diagnosis    Type 2 diabetes mellitus with complication, without long-term current use of insulin ([facility]    HTN (hypertension)    HLD (hyperlipidemia)    Paroxysmal A-fib (HCC)    Hypovitaminosis D    Diastolic CHF, chronic (HCC)    Depression    Pre-syncope    PAT (acute kidney injury) ([facility]    Stage 3b chronic kidney disease ([facility]       Review of Systems   Constitutional: Negative for chills, fatigue and fever. HENT: Negative for congestion. Respiratory: Positive for cough. Negative for shortness of breath and wheezing. Cardiovascular: Negative for chest pain and palpitations. Gastrointestinal: Negative for abdominal distention, abdominal pain, diarrhea, nausea and vomiting. Genitourinary: Negative for dysuria. Neurological: Negative for dizziness, syncope, weakness, light-headedness and numbness. Prior to Visit Medications    Medication Sig Taking?  Authorizing Provider   Dulaglutide (TRULICITY) 1.5 CZ/6.9OU SOPN Inject 1.5 mg into the skin once a week Yes Garry Mojica MD   insulin glargine (BASAGLAR KWIKPEN) 100 UNIT/ML injection pen Inject 20 Units into the skin nightly Yes Garry Mojica MD   blood glucose monitor kit and supplies Dispense sufficient amount for indicated testing frequency plus additional to accommodate PRN testing needs. Dispense all needed supplies to include: monitor, strips, lancing device, lancets, control solutions, alcohol swabs. Yes Ralph Barney MD   carvedilol (COREG) 25 MG tablet take 1 tablet by mouth 2 times a day with meals Yes Karen Rodríguez MD   lisinopril (PRINIVIL;ZESTRIL) 40 MG tablet TAKE ONE TABLET BY MOUTH ONE TIME DAILY Yes Karen Rodríguez MD   atorvastatin (LIPITOR) 40 MG tablet Take 1 tablet by mouth daily Yes Karen Rodríguez MD   dapagliflozin (FARXIGA) 10 MG tablet Take 1 tablet by mouth every morning Yes Karen Rodríguez MD   glimepiride (AMARYL) 4 MG tablet Take 1 tablet by mouth every morning (before breakfast) Yes Ralph Barney MD   Ramiro Ellis MISC by Does not apply route Start date: 10/27/21  End date 10/26/26 Yes Ralph Barney MD   ezetimibe (ZETIA) 10 MG tablet TAKE 1 TABLET BY MOUTH ONE TIME A DAY Yes Shell Pereyra MD   amLODIPine (NORVASC) 10 MG tablet TAKE 1 TABLET BY MOUTH ONE TIME A DAY Yes Shell Pereyra MD   furosemide (LASIX) 40 MG tablet TAKE 1 TABLET BY MOUTH ONE TIME A DAY Yes Shell Pereyra MD   Calcium-Vitamin D 600-200 MG-UNIT TABS Take 1 tablet by mouth 2 times daily Yes Carine Eagle MD   aspirin (ASPIRIN CHILDRENS) 81 MG chewable tablet Take 1 tablet by mouth daily Yes Carine Eagle MD   vitamin D3 (CHOLECALCIFEROL) 25 MCG (1000 UT) TABS tablet Take 1 tablet by mouth daily Yes Carine Eagle MD        No Known Allergies    Past Medical History:   Diagnosis Date    Atrial fibrillation ([facility]     CKD (chronic kidney disease) stage 3, GFR 30-59 ml/min (HCC)     Diabetes mellitus type II     Diastolic CHF, chronic (HCC)     HTN (hypertension)     Hyperlipidemia     Tobacco abuse        No past surgical history on file.     Social History     Socioeconomic History    Marital status:      Spouse name: Not on file    Number of children: Not on file    Years of education: Not on file    Highest education level: Not on file   Occupational History    Not on file   Tobacco Use    Smoking status: Former Smoker     Packs/day: 0.33     Years: 20.00     Pack years: 6.60     Types: Cigarettes     Quit date: 2016     Years since quittin.1    Smokeless tobacco: Current User     Types: Snuff, Chew    Tobacco comment: Trying to quit   Substance and Sexual Activity    Alcohol use: Not Currently     Comment: about a glass a week, of liquor    Drug use: Never    Sexual activity: Not on file   Other Topics Concern    Not on file   Social History Narrative    Not on file     Social Determinants of Health     Financial Resource Strain:     Difficulty of Paying Living Expenses: Not on file   Food Insecurity:     Worried About Running Out of Food in the Last Year: Not on file    Shona of Food in the Last Year: Not on file   Transportation Needs:     Lack of Transportation (Medical): Not on file    Lack of Transportation (Non-Medical): Not on file   Physical Activity:     Days of Exercise per Week: Not on file    Minutes of Exercise per Session: Not on file   Stress:     Feeling of Stress : Not on file   Social Connections:     Frequency of Communication with Friends and Family: Not on file    Frequency of Social Gatherings with Friends and Family: Not on file    Attends Voodoo Services: Not on file    Active Member of [address] [place] [address] Offsite Care Resources or Organizations: Not on file    Attends Club or Organization Meetings: Not on file    Marital Status: Not on file   Intimate Partner Violence:     Fear of Current or Ex-Partner: Not on file    Emotionally Abused: Not on file    Physically Abused: Not on file    Sexually Abused: Not on file   Housing Stability:     Unable to Pay for Housing in the Last Year: Not on file    Number of [place] in the Last Year: Not on file    Unstable Housing in the Last Year: Not on file        No family history on file.     ADVANCE DIRECTIVE: N, <no information>    Vitals:    02/16/22 1501   BP: 127/71   Site: Left Upper Arm   Position: Sitting   Cuff Size: Medium Adult   Pulse: 70   Temp: 96.6 °F (35.9 °C)   TempSrc: Temporal   SpO2: 99%   Weight: 183 lb 3.2 oz (83.1 kg)   Height: 5' 4\" (1.626 m)     Estimated body mass index is 31.45 kg/m² as calculated from the following:    Height as of this encounter: 5' 4\" (1.626 m). Weight as of this encounter: 183 lb 3.2 oz (83.1 kg). Physical Exam  Constitutional:       General: She is not in acute distress. Appearance: She is well-developed. She is not diaphoretic. HENT:      Head: Normocephalic and atraumatic. Cardiovascular:      Rate and Rhythm: Normal rate and regular rhythm. Heart sounds: Normal heart sounds. No murmur heard. Pulmonary:      Effort: Pulmonary effort is normal. No respiratory distress. Breath sounds: Normal breath sounds. No wheezing. Abdominal:      General: Bowel sounds are normal. There is no distension. Palpations: Abdomen is soft. Tenderness: There is no abdominal tenderness. Skin:     General: Skin is warm and dry. Capillary Refill: Capillary refill takes less than 2 seconds. Findings: No erythema. Neurological:      Mental Status: She is alert and oriented to person, place, and time. Psychiatric:         Behavior: Behavior normal.         No flowsheet data found.     Lab Results   Component Value Date    CHOL 230 10/04/2021    CHOL 238 03/19/2018    CHOL 258 05/08/2017    CHOLFAST 232 08/13/2020    CHOLFAST 222 03/08/2019    TRIG 104 10/04/2021    TRIG 161 03/19/2018    TRIG 92 05/08/2017    TRIGLYCFAST 98 08/13/2020    TRIGLYCFAST 109 03/08/2019    HDL 68 10/04/2021    HDL 86 08/13/2020    HDL 73 03/08/2019    LDLCALC 141 10/04/2021    LDLCALC 126 08/13/2020    LDLCALC 127 03/08/2019    GLUCOSE 493 10/04/2021    LABA1C 14.0 02/16/2022    LABA1C 14.0 12/30/2021    LABA1C 14.0 09/24/2021       The 10-year ASCVD risk score Jordan Pinzon Mikie Duvall, et al., 2013) is: 44.6%    Values used to calculate the score:      Age: 64 years      Sex: Female      Is Non- : Yes      Diabetic: Yes      Tobacco smoker: No      Systolic Blood Pressure: 081 mmHg      Is BP treated: Yes      HDL Cholesterol: 68 mg/dL      Total Cholesterol: 230 mg/dL      There is no immunization history on file for this patient. Health Maintenance   Topic Date Due    Hepatitis C screen  Never done    Depression Monitoring  Never done    DTaP/Tdap/Td vaccine (1 - Tdap) Never done    Shingles Vaccine (1 of 2) Never done    DEXA (modify frequency per FRAX score)  Never done    Pneumococcal 65+ years Vaccine (1 of 1 - PPSV23) Never done   [employer] Visit (AWV)  Never done    COVID-19 Vaccine (1) 10/01/2022 (Originally 11/17/1950)    Flu vaccine (1) 12/30/2022 (Originally 9/1/2021)    Lipid screen  10/04/2022    Potassium monitoring  10/04/2022    Creatinine monitoring  10/04/2022    Hepatitis A vaccine  Aged Out    Hib vaccine  Aged Out    Meningococcal (ACWY) vaccine  Aged Out       Assessment & Plan   Type 2 diabetes mellitus without complication, without long-term current use of insulin ([facility])  - A1c continues to be >14.0%. - Will continue insulin  - Restart Trulicity  - Will get new monitor today  -     POCT glycosylated hemoglobin (Hb A1C)  -     Dulaglutide (TRULICITY) 1.5 NO/0.2XV SOPN; Inject 1.5 mg into the skin once a week, Disp-6 mL, R-3Normal  -     insulin glargine (BASAGLAR KWIKPEN) 100 UNIT/ML injection pen; Inject 20 Units into the skin nightly, Disp-5 pen, R-0Normal  -     blood glucose monitor kit and supplies; Dispense sufficient amount for indicated testing frequency plus additional to accommodate PRN testing needs. Dispense all needed supplies to include: monitor, strips, lancing device, lancets, control solutions, alcohol swabs., Disp-1 kit, R-0, Normal    Stage 3b chronic kidney disease ([facility]  - Stable.  Well controlled. No changes to medications. Chronic diastolic congestive heart failure ([facility] [address]  - Well controlled. No exacerbations. No problems with medications. No changes. Paroxysmal atrial fibrillation (HCC)  - NSR on exam today. No changes to medications    Type 2 diabetes mellitus with complication, without long-term current use of insulin (HCC)  -     Dulaglutide (TRULICITY) 1.5 SK/2.8TH SOPN; Inject 1.5 mg into the skin once a week, Disp-6 mL, R-3Normal  -     insulin glargine (BASAGLAR KWIKPEN) 100 UNIT/ML injection pen; Inject 20 Units into the skin nightly, Disp-5 pen, R-0Normal    Return in about 3 months (around 5/16/2022), or if symptoms worsen or fail to improve, for Office follow-up.          --Marimar Browne MD

## 2022-03-16 DIAGNOSIS — E78.00 PURE HYPERCHOLESTEROLEMIA: ICD-10-CM

## 2022-03-16 DIAGNOSIS — I10 ESSENTIAL HYPERTENSION: ICD-10-CM

## 2022-03-17 RX ORDER — AMLODIPINE BESYLATE 10 MG/1
TABLET ORAL
Qty: 30 TABLET | Refills: 3 | Status: SHIPPED | OUTPATIENT
Start: 2022-03-17 | End: 2022-04-06

## 2022-03-17 RX ORDER — EZETIMIBE 10 MG/1
TABLET ORAL
Qty: 30 TABLET | Refills: 2 | Status: SHIPPED | OUTPATIENT
Start: 2022-03-17 | End: 2022-07-28

## 2022-03-28 ENCOUNTER — APPOINTMENT (OUTPATIENT)
Dept: CT IMAGING | Age: 77
DRG: 638 | End: 2022-03-28
Payer: MEDICARE

## 2022-03-28 ENCOUNTER — APPOINTMENT (OUTPATIENT)
Dept: GENERAL RADIOLOGY | Age: 77
DRG: 638 | End: 2022-03-28
Payer: MEDICARE

## 2022-03-28 ENCOUNTER — HOSPITAL ENCOUNTER (INPATIENT)
Age: 77
LOS: 2 days | Discharge: HOME HEALTH | DRG: 638 | End: 2022-03-30
Attending: EMERGENCY MEDICINE | Admitting: INTERNAL MEDICINE
Payer: MEDICARE

## 2022-03-28 DIAGNOSIS — E87.1 HYPONATREMIA: ICD-10-CM

## 2022-03-28 DIAGNOSIS — R73.9 HYPERGLYCEMIA: Primary | ICD-10-CM

## 2022-03-28 DIAGNOSIS — G25.2 COARSE TREMORS: ICD-10-CM

## 2022-03-28 LAB
ALBUMIN SERPL-MCNC: 3.8 G/DL (ref 3.4–5)
ALP BLD-CCNC: 125 U/L (ref 40–129)
ALT SERPL-CCNC: <5 U/L (ref 10–40)
AMMONIA: 15 UMOL/L (ref 11–51)
AMPHETAMINE SCREEN, URINE: NORMAL
AMYLASE: 60 U/L (ref 25–115)
ANION GAP SERPL CALCULATED.3IONS-SCNC: 13 MMOL/L (ref 3–16)
AST SERPL-CCNC: 7 U/L (ref 15–37)
BACTERIA: ABNORMAL /HPF
BARBITURATE SCREEN URINE: NORMAL
BASE EXCESS VENOUS: -1.9 MMOL/L (ref -2–3)
BASOPHILS ABSOLUTE: 0 K/UL (ref 0–0.2)
BASOPHILS RELATIVE PERCENT: 0.3 %
BENZODIAZEPINE SCREEN, URINE: NORMAL
BILIRUB SERPL-MCNC: 0.3 MG/DL (ref 0–1)
BILIRUBIN DIRECT: <0.2 MG/DL (ref 0–0.3)
BILIRUBIN URINE: NEGATIVE
BILIRUBIN, INDIRECT: ABNORMAL MG/DL (ref 0–1)
BLOOD, URINE: ABNORMAL
BUN BLDV-MCNC: 37 MG/DL (ref 7–20)
CALCIUM IONIZED: 1.17 MMOL/L (ref 1.12–1.32)
CALCIUM SERPL-MCNC: 9.5 MG/DL (ref 8.3–10.6)
CANNABINOID SCREEN URINE: NORMAL
CARBOXYHEMOGLOBIN: 1.4 % (ref 0–1.5)
CHLORIDE BLD-SCNC: 88 MMOL/L (ref 99–110)
CLARITY: CLEAR
CO2: 21 MMOL/L (ref 21–32)
COCAINE METABOLITE SCREEN URINE: NORMAL
COLOR: YELLOW
CREAT SERPL-MCNC: 1.9 MG/DL (ref 0.6–1.2)
EKG ATRIAL RATE: 93 BPM
EKG DIAGNOSIS: NORMAL
EKG P AXIS: 65 DEGREES
EKG P-R INTERVAL: 184 MS
EKG Q-T INTERVAL: 388 MS
EKG QRS DURATION: 100 MS
EKG QTC CALCULATION (BAZETT): 482 MS
EKG R AXIS: -32 DEGREES
EKG T AXIS: 14 DEGREES
EKG VENTRICULAR RATE: 93 BPM
EOSINOPHILS ABSOLUTE: 0 K/UL (ref 0–0.6)
EOSINOPHILS RELATIVE PERCENT: 0.4 %
GFR AFRICAN AMERICAN: 31
GFR NON-AFRICAN AMERICAN: 26
GLUCOSE BLD-MCNC: 186 MG/DL (ref 70–99)
GLUCOSE BLD-MCNC: 350 MG/DL (ref 70–99)
GLUCOSE BLD-MCNC: 443 MG/DL (ref 70–99)
GLUCOSE BLD-MCNC: 717 MG/DL (ref 70–99)
GLUCOSE BLD-MCNC: >600 MG/DL (ref 70–99)
GLUCOSE URINE: >=1000 MG/DL
HCO3 VENOUS: 23.2 MMOL/L (ref 24–28)
HCT VFR BLD CALC: 32 % (ref 36–48)
HEMOGLOBIN, VEN, REDUCED: 40.8 %
HEMOGLOBIN: 10.5 G/DL (ref 12–16)
KETONES, URINE: ABNORMAL MG/DL
LACTIC ACID, SEPSIS: 1.5 MMOL/L (ref 0.4–1.9)
LACTIC ACID: 2.1 MMOL/L (ref 0.4–2)
LEUKOCYTE ESTERASE, URINE: NEGATIVE
LIPASE: 210 U/L (ref 13–60)
LYMPHOCYTES ABSOLUTE: 0.9 K/UL (ref 1–5.1)
LYMPHOCYTES RELATIVE PERCENT: 16.9 %
Lab: NORMAL
MAGNESIUM: 2.1 MG/DL (ref 1.8–2.4)
MCH RBC QN AUTO: 27.3 PG (ref 26–34)
MCHC RBC AUTO-ENTMCNC: 32.7 G/DL (ref 31–36)
MCV RBC AUTO: 83.5 FL (ref 80–100)
METHADONE SCREEN, URINE: NORMAL
METHEMOGLOBIN VENOUS: 0.3 % (ref 0–1.5)
MICROSCOPIC EXAMINATION: YES
MONOCYTES ABSOLUTE: 0.3 K/UL (ref 0–1.3)
MONOCYTES RELATIVE PERCENT: 5.1 %
NEUTROPHILS ABSOLUTE: 4.3 K/UL (ref 1.7–7.7)
NEUTROPHILS RELATIVE PERCENT: 77.3 %
NITRITE, URINE: NEGATIVE
O2 SAT, VEN: 59 %
OPIATE SCREEN URINE: NORMAL
OSMOLALITY: 300 MOSM/KG (ref 278–305)
OXYCODONE URINE: NORMAL
PCO2, VEN: 39.9 MMHG (ref 41–51)
PDW BLD-RTO: 13.4 % (ref 12.4–15.4)
PERFORMED ON: ABNORMAL
PH UA: 7
PH UA: 7.5 (ref 5–8)
PH VENOUS: 7.36 (ref 7.35–7.45)
PH VENOUS: 7.37 (ref 7.35–7.45)
PHENCYCLIDINE SCREEN URINE: NORMAL
PLATELET # BLD: 252 K/UL (ref 135–450)
PMV BLD AUTO: 10.4 FL (ref 5–10.5)
PO2, VEN: <30 MMHG (ref 25–40)
POTASSIUM REFLEX MAGNESIUM: 4.6 MMOL/L (ref 3.5–5.1)
PRO-BNP: 2682 PG/ML (ref 0–449)
PROPOXYPHENE SCREEN: NORMAL
PROTEIN UA: 30 MG/DL
RBC # BLD: 3.83 M/UL (ref 4–5.2)
RBC UA: ABNORMAL /HPF (ref 0–4)
SODIUM BLD-SCNC: 122 MMOL/L (ref 136–145)
SPECIFIC GRAVITY UA: 1.01 (ref 1–1.03)
TCO2 CALC VENOUS: 24 MMOL/L
TOTAL PROTEIN: 7.7 G/DL (ref 6.4–8.2)
TROPONIN: <0.01 NG/ML
URINE TYPE: ABNORMAL
UROBILINOGEN, URINE: 0.2 E.U./DL
WBC # BLD: 5.5 K/UL (ref 4–11)
WBC UA: ABNORMAL /HPF (ref 0–5)

## 2022-03-28 PROCEDURE — 87086 URINE CULTURE/COLONY COUNT: CPT

## 2022-03-28 PROCEDURE — 96375 TX/PRO/DX INJ NEW DRUG ADDON: CPT

## 2022-03-28 PROCEDURE — 99284 EMERGENCY DEPT VISIT MOD MDM: CPT

## 2022-03-28 PROCEDURE — 2580000003 HC RX 258: Performed by: STUDENT IN AN ORGANIZED HEALTH CARE EDUCATION/TRAINING PROGRAM

## 2022-03-28 PROCEDURE — 83690 ASSAY OF LIPASE: CPT

## 2022-03-28 PROCEDURE — 83036 HEMOGLOBIN GLYCOSYLATED A1C: CPT

## 2022-03-28 PROCEDURE — 6360000002 HC RX W HCPCS: Performed by: STUDENT IN AN ORGANIZED HEALTH CARE EDUCATION/TRAINING PROGRAM

## 2022-03-28 PROCEDURE — 81001 URINALYSIS AUTO W/SCOPE: CPT

## 2022-03-28 PROCEDURE — 6370000000 HC RX 637 (ALT 250 FOR IP): Performed by: STUDENT IN AN ORGANIZED HEALTH CARE EDUCATION/TRAINING PROGRAM

## 2022-03-28 PROCEDURE — 2580000003 HC RX 258: Performed by: EMERGENCY MEDICINE

## 2022-03-28 PROCEDURE — 80076 HEPATIC FUNCTION PANEL: CPT

## 2022-03-28 PROCEDURE — 83605 ASSAY OF LACTIC ACID: CPT

## 2022-03-28 PROCEDURE — 85025 COMPLETE CBC W/AUTO DIFF WBC: CPT

## 2022-03-28 PROCEDURE — 84443 ASSAY THYROID STIM HORMONE: CPT

## 2022-03-28 PROCEDURE — 2060000000 HC ICU INTERMEDIATE R&B

## 2022-03-28 PROCEDURE — 82140 ASSAY OF AMMONIA: CPT

## 2022-03-28 PROCEDURE — 82150 ASSAY OF AMYLASE: CPT

## 2022-03-28 PROCEDURE — 80048 BASIC METABOLIC PNL TOTAL CA: CPT

## 2022-03-28 PROCEDURE — 80307 DRUG TEST PRSMV CHEM ANLYZR: CPT

## 2022-03-28 PROCEDURE — 83930 ASSAY OF BLOOD OSMOLALITY: CPT

## 2022-03-28 PROCEDURE — 93005 ELECTROCARDIOGRAM TRACING: CPT | Performed by: EMERGENCY MEDICINE

## 2022-03-28 PROCEDURE — 70450 CT HEAD/BRAIN W/O DYE: CPT

## 2022-03-28 PROCEDURE — 82803 BLOOD GASES ANY COMBINATION: CPT

## 2022-03-28 PROCEDURE — 84484 ASSAY OF TROPONIN QUANT: CPT

## 2022-03-28 PROCEDURE — 83880 ASSAY OF NATRIURETIC PEPTIDE: CPT

## 2022-03-28 PROCEDURE — 96374 THER/PROPH/DIAG INJ IV PUSH: CPT

## 2022-03-28 PROCEDURE — 6360000004 HC RX CONTRAST MEDICATION: Performed by: STUDENT IN AN ORGANIZED HEALTH CARE EDUCATION/TRAINING PROGRAM

## 2022-03-28 PROCEDURE — 74176 CT ABD & PELVIS W/O CONTRAST: CPT

## 2022-03-28 PROCEDURE — 6360000002 HC RX W HCPCS: Performed by: EMERGENCY MEDICINE

## 2022-03-28 PROCEDURE — 83735 ASSAY OF MAGNESIUM: CPT

## 2022-03-28 PROCEDURE — 71046 X-RAY EXAM CHEST 2 VIEWS: CPT

## 2022-03-28 PROCEDURE — 36415 COLL VENOUS BLD VENIPUNCTURE: CPT

## 2022-03-28 RX ORDER — 0.9 % SODIUM CHLORIDE 0.9 %
1000 INTRAVENOUS SOLUTION INTRAVENOUS ONCE
Status: COMPLETED | OUTPATIENT
Start: 2022-03-28 | End: 2022-03-28

## 2022-03-28 RX ORDER — HEPARIN SODIUM 5000 [USP'U]/ML
5000 INJECTION, SOLUTION INTRAVENOUS; SUBCUTANEOUS EVERY 8 HOURS SCHEDULED
Status: DISCONTINUED | OUTPATIENT
Start: 2022-03-28 | End: 2022-03-30 | Stop reason: HOSPADM

## 2022-03-28 RX ORDER — CARVEDILOL 25 MG/1
25 TABLET ORAL 2 TIMES DAILY
Status: DISCONTINUED | OUTPATIENT
Start: 2022-03-28 | End: 2022-03-30 | Stop reason: HOSPADM

## 2022-03-28 RX ORDER — ACETAMINOPHEN 325 MG/1
650 TABLET ORAL EVERY 6 HOURS PRN
Status: DISCONTINUED | OUTPATIENT
Start: 2022-03-28 | End: 2022-03-30 | Stop reason: HOSPADM

## 2022-03-28 RX ORDER — SODIUM CHLORIDE 0.9 % (FLUSH) 0.9 %
5-40 SYRINGE (ML) INJECTION PRN
Status: DISCONTINUED | OUTPATIENT
Start: 2022-03-28 | End: 2022-03-30 | Stop reason: HOSPADM

## 2022-03-28 RX ORDER — SODIUM CHLORIDE 0.9 % (FLUSH) 0.9 %
5-40 SYRINGE (ML) INJECTION EVERY 12 HOURS SCHEDULED
Status: DISCONTINUED | OUTPATIENT
Start: 2022-03-28 | End: 2022-03-30 | Stop reason: HOSPADM

## 2022-03-28 RX ORDER — AMLODIPINE BESYLATE 10 MG/1
10 TABLET ORAL DAILY
Status: DISCONTINUED | OUTPATIENT
Start: 2022-03-28 | End: 2022-03-30 | Stop reason: HOSPADM

## 2022-03-28 RX ORDER — ATORVASTATIN CALCIUM 40 MG/1
40 TABLET, FILM COATED ORAL DAILY
Status: DISCONTINUED | OUTPATIENT
Start: 2022-03-28 | End: 2022-03-30 | Stop reason: HOSPADM

## 2022-03-28 RX ORDER — POLYETHYLENE GLYCOL 3350 17 G/17G
17 POWDER, FOR SOLUTION ORAL DAILY PRN
Status: DISCONTINUED | OUTPATIENT
Start: 2022-03-28 | End: 2022-03-30 | Stop reason: HOSPADM

## 2022-03-28 RX ORDER — ACETAMINOPHEN 650 MG/1
650 SUPPOSITORY RECTAL EVERY 6 HOURS PRN
Status: DISCONTINUED | OUTPATIENT
Start: 2022-03-28 | End: 2022-03-30 | Stop reason: HOSPADM

## 2022-03-28 RX ORDER — ONDANSETRON 4 MG/1
4 TABLET, ORALLY DISINTEGRATING ORAL EVERY 8 HOURS PRN
Status: DISCONTINUED | OUTPATIENT
Start: 2022-03-28 | End: 2022-03-30 | Stop reason: HOSPADM

## 2022-03-28 RX ORDER — INSULIN LISPRO 100 [IU]/ML
7 INJECTION, SOLUTION INTRAVENOUS; SUBCUTANEOUS
Status: DISCONTINUED | OUTPATIENT
Start: 2022-03-28 | End: 2022-03-29

## 2022-03-28 RX ORDER — SODIUM CHLORIDE 9 MG/ML
INJECTION, SOLUTION INTRAVENOUS PRN
Status: DISCONTINUED | OUTPATIENT
Start: 2022-03-28 | End: 2022-03-30 | Stop reason: HOSPADM

## 2022-03-28 RX ORDER — LORAZEPAM 2 MG/ML
0.5 INJECTION INTRAMUSCULAR ONCE
Status: COMPLETED | OUTPATIENT
Start: 2022-03-28 | End: 2022-03-28

## 2022-03-28 RX ORDER — ASPIRIN 81 MG/1
81 TABLET, CHEWABLE ORAL DAILY
Status: DISCONTINUED | OUTPATIENT
Start: 2022-03-28 | End: 2022-03-30 | Stop reason: HOSPADM

## 2022-03-28 RX ORDER — SODIUM CHLORIDE 9 MG/ML
INJECTION, SOLUTION INTRAVENOUS CONTINUOUS
Status: DISCONTINUED | OUTPATIENT
Start: 2022-03-28 | End: 2022-03-29

## 2022-03-28 RX ORDER — DEXTROSE MONOHYDRATE 50 MG/ML
100 INJECTION, SOLUTION INTRAVENOUS PRN
Status: DISCONTINUED | OUTPATIENT
Start: 2022-03-28 | End: 2022-03-30 | Stop reason: HOSPADM

## 2022-03-28 RX ORDER — ONDANSETRON 2 MG/ML
4 INJECTION INTRAMUSCULAR; INTRAVENOUS EVERY 6 HOURS PRN
Status: DISCONTINUED | OUTPATIENT
Start: 2022-03-28 | End: 2022-03-30 | Stop reason: HOSPADM

## 2022-03-28 RX ORDER — INSULIN LISPRO 100 [IU]/ML
0-18 INJECTION, SOLUTION INTRAVENOUS; SUBCUTANEOUS EVERY 4 HOURS
Status: DISCONTINUED | OUTPATIENT
Start: 2022-03-28 | End: 2022-03-29

## 2022-03-28 RX ADMIN — ATORVASTATIN CALCIUM 40 MG: 40 TABLET, FILM COATED ORAL at 21:05

## 2022-03-28 RX ADMIN — SODIUM CHLORIDE 1000 ML: 9 INJECTION, SOLUTION INTRAVENOUS at 13:56

## 2022-03-28 RX ADMIN — AMLODIPINE BESYLATE 10 MG: 10 TABLET ORAL at 18:18

## 2022-03-28 RX ADMIN — SODIUM CHLORIDE 1000 ML: 9 INJECTION, SOLUTION INTRAVENOUS at 11:36

## 2022-03-28 RX ADMIN — SODIUM CHLORIDE: 9 INJECTION, SOLUTION INTRAVENOUS at 18:23

## 2022-03-28 RX ADMIN — HEPARIN SODIUM 5000 UNITS: 5000 INJECTION INTRAVENOUS; SUBCUTANEOUS at 21:05

## 2022-03-28 RX ADMIN — IOHEXOL 50 ML: 240 INJECTION, SOLUTION INTRATHECAL; INTRAVASCULAR; INTRAVENOUS; ORAL at 15:00

## 2022-03-28 RX ADMIN — CARVEDILOL 25 MG: 25 TABLET, FILM COATED ORAL at 21:05

## 2022-03-28 RX ADMIN — HEPARIN SODIUM 5000 UNITS: 5000 INJECTION INTRAVENOUS; SUBCUTANEOUS at 18:18

## 2022-03-28 RX ADMIN — ASPIRIN 81 MG: 81 TABLET, CHEWABLE ORAL at 18:18

## 2022-03-28 RX ADMIN — INSULIN HUMAN 10 UNITS: 100 INJECTION, SOLUTION PARENTERAL at 13:31

## 2022-03-28 RX ADMIN — LORAZEPAM 0.5 MG: 2 INJECTION INTRAMUSCULAR; INTRAVENOUS at 11:39

## 2022-03-28 RX ADMIN — INSULIN GLARGINE 20 UNITS: 100 INJECTION, SOLUTION SUBCUTANEOUS at 21:15

## 2022-03-28 RX ADMIN — SODIUM CHLORIDE, PRESERVATIVE FREE 10 ML: 5 INJECTION INTRAVENOUS at 21:19

## 2022-03-28 RX ADMIN — INSULIN LISPRO 15 UNITS: 100 INJECTION, SOLUTION INTRAVENOUS; SUBCUTANEOUS at 18:18

## 2022-03-28 RX ADMIN — INSULIN LISPRO 7 UNITS: 100 INJECTION, SOLUTION INTRAVENOUS; SUBCUTANEOUS at 18:27

## 2022-03-28 RX ADMIN — INSULIN LISPRO 3 UNITS: 100 INJECTION, SOLUTION INTRAVENOUS; SUBCUTANEOUS at 21:16

## 2022-03-28 ASSESSMENT — ENCOUNTER SYMPTOMS
ABDOMINAL PAIN: 0
NAUSEA: 0
DIARRHEA: 0
PHOTOPHOBIA: 0
SHORTNESS OF BREATH: 0
EYE DISCHARGE: 0
EYE ITCHING: 0
FACIAL SWELLING: 0
BACK PAIN: 0
SORE THROAT: 0
COUGH: 0
VOMITING: 0
RHINORRHEA: 0
CHEST TIGHTNESS: 0
WHEEZING: 0
EYE PAIN: 0

## 2022-03-28 ASSESSMENT — PAIN SCALES - GENERAL
PAINLEVEL_OUTOF10: 9
PAINLEVEL_OUTOF10: 0

## 2022-03-28 ASSESSMENT — PAIN DESCRIPTION - PROGRESSION: CLINICAL_PROGRESSION: NOT CHANGED

## 2022-03-28 ASSESSMENT — PAIN DESCRIPTION - ORIENTATION: ORIENTATION: POSTERIOR

## 2022-03-28 ASSESSMENT — PAIN DESCRIPTION - PAIN TYPE: TYPE: ACUTE PAIN

## 2022-03-28 ASSESSMENT — PAIN DESCRIPTION - LOCATION: LOCATION: HEAD

## 2022-03-28 ASSESSMENT — PAIN DESCRIPTION - DESCRIPTORS: DESCRIPTORS: HEADACHE

## 2022-03-28 ASSESSMENT — PAIN - FUNCTIONAL ASSESSMENT: PAIN_FUNCTIONAL_ASSESSMENT: PREVENTS OR INTERFERES SOME ACTIVE ACTIVITIES AND ADLS

## 2022-03-28 ASSESSMENT — PAIN DESCRIPTION - ONSET: ONSET: PROGRESSIVE

## 2022-03-28 NOTE — ED TRIAGE NOTES
Pt arrived to ED with right sided headache since Thursday and tremors in right arm since yesterday evening. Per daughters, tremors worse today. Pt denies pain/weakness in arms/legs. Family also mentions glucometer has been reading \"HI\" since yesterday.

## 2022-03-28 NOTE — H&P
Internal Medicine  PGY 1  History & Physical      CC Headache    History Obtained From:  patient    HISTORY OF PRESENT ILLNESS:    Arie Yuen is a 75yoF with a PMHx paroxysmal Afib, CKD stage 3, DM2, HTN, HLD, HFpEF who presented with HA. Patient reports the headache as right sided, dull and achy. Around the same time she has been experiencing \"shaking\", increased thirst and urination. Patient reports her glucometer has read her sugar as \"high\" over the past few days. She states she is compliance with her medications. She is on trulicity, farxiga and takes glargine 20 units nightly at home. Patient's hemoglobin a1c has been 14.0 on the last few checks. Patient reports a non-productive cough for the past few days but denies any f/c, SOB, CP, palpitations, abdominal pain, N/V or urinary symptoms. Denies any weight loss but appears to have lost close to 30 lb in last year. Hx of smoking a pack a week for a few years but quit about 6 years ago. Drinks socially. Denies recreational drug use. Lives at home is is independent. Past Medical History:        Diagnosis Date    Atrial fibrillation (HCC)     CKD (chronic kidney disease) stage 3, GFR 30-59 ml/min (HCC)     Diabetes mellitus type II     Diastolic CHF, chronic (HCC)     HTN (hypertension)     Hyperlipidemia     Tobacco abuse    ·     Past Surgical History:    · History reviewed. No pertinent surgical history. Medications Priorto Admission:    · Not in a hospital admission. Allergies:  Patient has no known allergies. Social History:   · TOBACCO:   reports that she quit smoking about 6 years ago. Her smoking use included cigarettes. She has a 6.60 pack-year smoking history. Her smokeless tobacco use includes snuff and chew. · ETOH:   reports previous alcohol use. · DRUGS : none  · Patient currently lives alone    Family History:   · History reviewed. No pertinent family history.     Review of Systems     ROS: A 10 point review of systems was conducted, significant findings as noted in HPI. Physical Exam  Constitutional:       General: She is not in acute distress. Appearance: She is not ill-appearing. HENT:      Head: Normocephalic and atraumatic. Mouth/Throat:      Mouth: Mucous membranes are dry. Cardiovascular:      Rate and Rhythm: Normal rate and regular rhythm. Heart sounds: No murmur heard. No gallop. Pulmonary:      Effort: No respiratory distress. Breath sounds: No wheezing or rales. Abdominal:      General: There is distension. Palpations: Abdomen is soft. Tenderness: There is no abdominal tenderness. There is no guarding. Musculoskeletal:      Right lower leg: No edema. Left lower leg: No edema. Skin:     General: Skin is dry. Neurological:      General: No focal deficit present. Mental Status: She is alert and oriented to person, place, and time. Physical exam:       Vitals:    03/28/22 1200   BP: (!) 173/89   Pulse: 102   Resp: 19   Temp:    SpO2: 100%       DATA:    Labs:  CBC:   Recent Labs     03/28/22  1149   WBC 5.5   HGB 10.5*   HCT 32.0*          BMP:   Recent Labs     03/28/22  1149   *   K 4.6   CL 88*   CO2 21   BUN 37*   CREATININE 1.9*   GLUCOSE 717*     LFT's:   Recent Labs     03/28/22  1149   AST 7*   ALT <5*   BILITOT 0.3   ALKPHOS 125     Troponin:   Recent Labs     03/28/22  1149   TROPONINI <0.01     BNP:No results for input(s): BNP in the last 72 hours. ABGs: No results for input(s): PHART, [id number], PO2ART in the last 72 hours. INR: No results for input(s): INR in the last 72 hours. U/A:No results for input(s): NITRITE, COLORU, PHUR, LABCAST, WBCUA, RBCUA, MUCUS, TRICHOMONAS, YEAST, BACTERIA, CLARITYU, SPECGRAV, LEUKOCYTESUR, UROBILINOGEN, BILIRUBINUR, BLOODU, GLUCOSEU, AMORPHOUS in the last 72 hours. Invalid input(s): KETONESU    CT HEAD WO CONTRAST   Final Result      No acute intracranial abnormality.  No significant change from prior study. XR CHEST (2 VW)   Final Result   1. No evidence of acute cardiopulmonary disease. ASSESSMENT AND PLAN:    Hyperglycemia likely 2/2 non-compliance  Patient glucose was 717 on admission. UA showed glucose >1000, trace ketones and protein. VBG without acidosis. Hyperglycemia likely 2/2 noncompliance. Last hemoglobin a1c was 14. 10 units of regular insulin. S/P 1L NS bolus. - Give another IVF bolus and start IVF at 100  - Lantus 20 units nightly  - HDSS insulin  - Humalog 7 units 3x day  - Glucose checks q4h  - F/U serum osmo and hemoglobin a1c  - F/U CT abdomen wo contrast  - Carb control diet  - Will need endocrine consult as an outpatient    Tremors  CT head negative for intracranial abnormality.   - F/U TSH and UDS    CKD stage 3  Cr fluctuates and was 2.2 on 10/2021.   - Monitor Cr  - Strict I/Os  - Avoid nephrotoxic agents    Hyponatremia  Na is 122. Corrected Na is 137.   - Continue to monitor      Chronic Medical Conditions:  HTN: Cont amlodipine and coreg  HLD: Cont atorvastatin  Afib: Cont coreg    Will discuss with attending physician Dr. Esmer Nur MD    Code Status: limited x4  FEN: ADULT DIET;  Regular; 3 carb choices (45 gm/meal)  PPX: SQH  DISPO: IP    Jennifer Hoang MD  3/28/2022,  1:11 PM

## 2022-03-28 NOTE — PROGRESS NOTES
4 Eyes Admission Assessment     I agree as the admission nurse that 2 RN's have performed a thorough Head to Toe Skin Assessment on the patient. ALL assessment sites listed below have been assessed on admission. Areas assessed by both nurses:   [x]   Head, Face, and Ears   [x]   Shoulders, Back, and Chest  [x]   Arms, Elbows, and Hands   [x]   Coccyx, Sacrum, and Ischium -Scab present on R buttock  [x]   Legs, Feet, and Heels        Does the Patient have Skin Breakdown?   No         Vidal Prevention initiated:  No   Wound Care Orders initiated:  No      [facility] nurse consulted for Pressure Injury (Stage 3,4, Unstageable, DTI, NWPT, and Complex wounds) or Vidal score 18 or lower:  No      Nurse 1 eSignature: Electronically signed by Joseph Kim RN on 3/28/22 at 3:50 PM EDT    **SHARE this note so that the co-signing nurse is able to place an eSignature**    Nurse 2 eSignature: Electronically signed by Maranda Perez RN on 3/28/22 at 7:53 PM EDT

## 2022-03-29 ENCOUNTER — APPOINTMENT (OUTPATIENT)
Dept: CT IMAGING | Age: 77
DRG: 638 | End: 2022-03-29
Payer: MEDICARE

## 2022-03-29 LAB
ALBUMIN SERPL-MCNC: 3.2 G/DL (ref 3.4–5)
ANION GAP SERPL CALCULATED.3IONS-SCNC: 10 MMOL/L (ref 3–16)
BASOPHILS ABSOLUTE: 0.1 K/UL (ref 0–0.2)
BASOPHILS RELATIVE PERCENT: 1 %
BUN BLDV-MCNC: 30 MG/DL (ref 7–20)
CALCIUM SERPL-MCNC: 9 MG/DL (ref 8.3–10.6)
CHLORIDE BLD-SCNC: 104 MMOL/L (ref 99–110)
CO2: 22 MMOL/L (ref 21–32)
CREAT SERPL-MCNC: 1.5 MG/DL (ref 0.6–1.2)
EOSINOPHILS ABSOLUTE: 0.1 K/UL (ref 0–0.6)
EOSINOPHILS RELATIVE PERCENT: 1 %
ESTIMATED AVERAGE GLUCOSE: 469.9 MG/DL
GFR AFRICAN AMERICAN: 41
GFR NON-AFRICAN AMERICAN: 34
GLUCOSE BLD-MCNC: 111 MG/DL (ref 70–99)
GLUCOSE BLD-MCNC: 122 MG/DL (ref 70–99)
GLUCOSE BLD-MCNC: 123 MG/DL (ref 70–99)
GLUCOSE BLD-MCNC: 124 MG/DL (ref 70–99)
GLUCOSE BLD-MCNC: 137 MG/DL (ref 70–99)
GLUCOSE BLD-MCNC: 148 MG/DL (ref 70–99)
GLUCOSE BLD-MCNC: 173 MG/DL (ref 70–99)
GLUCOSE BLD-MCNC: 83 MG/DL (ref 70–99)
HBA1C MFR BLD: >18 %
HCT VFR BLD CALC: 26.4 % (ref 36–48)
HEMOGLOBIN: 9 G/DL (ref 12–16)
LACTIC ACID: 1 MMOL/L (ref 0.4–2)
LYMPHOCYTES ABSOLUTE: 1.6 K/UL (ref 1–5.1)
LYMPHOCYTES RELATIVE PERCENT: 27.5 %
MAGNESIUM: 2 MG/DL (ref 1.8–2.4)
MCH RBC QN AUTO: 27.5 PG (ref 26–34)
MCHC RBC AUTO-ENTMCNC: 34.1 G/DL (ref 31–36)
MCV RBC AUTO: 80.7 FL (ref 80–100)
MONOCYTES ABSOLUTE: 0.4 K/UL (ref 0–1.3)
MONOCYTES RELATIVE PERCENT: 6.2 %
NEUTROPHILS ABSOLUTE: 3.7 K/UL (ref 1.7–7.7)
NEUTROPHILS RELATIVE PERCENT: 64.3 %
PDW BLD-RTO: 13.2 % (ref 12.4–15.4)
PERFORMED ON: ABNORMAL
PERFORMED ON: NORMAL
PHOSPHORUS: 2.7 MG/DL (ref 2.5–4.9)
PLATELET # BLD: 215 K/UL (ref 135–450)
PMV BLD AUTO: 9.8 FL (ref 5–10.5)
POTASSIUM SERPL-SCNC: 4 MMOL/L (ref 3.5–5.1)
RBC # BLD: 3.27 M/UL (ref 4–5.2)
SODIUM BLD-SCNC: 136 MMOL/L (ref 136–145)
TSH REFLEX: 1.13 UIU/ML (ref 0.27–4.2)
URINE CULTURE, ROUTINE: NORMAL
WBC # BLD: 5.8 K/UL (ref 4–11)

## 2022-03-29 PROCEDURE — 83735 ASSAY OF MAGNESIUM: CPT

## 2022-03-29 PROCEDURE — 97530 THERAPEUTIC ACTIVITIES: CPT

## 2022-03-29 PROCEDURE — 83605 ASSAY OF LACTIC ACID: CPT

## 2022-03-29 PROCEDURE — 97162 PT EVAL MOD COMPLEX 30 MIN: CPT

## 2022-03-29 PROCEDURE — 2060000000 HC ICU INTERMEDIATE R&B

## 2022-03-29 PROCEDURE — 97165 OT EVAL LOW COMPLEX 30 MIN: CPT

## 2022-03-29 PROCEDURE — 97116 GAIT TRAINING THERAPY: CPT

## 2022-03-29 PROCEDURE — 6370000000 HC RX 637 (ALT 250 FOR IP): Performed by: STUDENT IN AN ORGANIZED HEALTH CARE EDUCATION/TRAINING PROGRAM

## 2022-03-29 PROCEDURE — 2580000003 HC RX 258: Performed by: STUDENT IN AN ORGANIZED HEALTH CARE EDUCATION/TRAINING PROGRAM

## 2022-03-29 PROCEDURE — 6370000000 HC RX 637 (ALT 250 FOR IP)

## 2022-03-29 PROCEDURE — 36415 COLL VENOUS BLD VENIPUNCTURE: CPT

## 2022-03-29 PROCEDURE — 80069 RENAL FUNCTION PANEL: CPT

## 2022-03-29 PROCEDURE — 97535 SELF CARE MNGMENT TRAINING: CPT

## 2022-03-29 PROCEDURE — 6360000002 HC RX W HCPCS: Performed by: STUDENT IN AN ORGANIZED HEALTH CARE EDUCATION/TRAINING PROGRAM

## 2022-03-29 PROCEDURE — 85025 COMPLETE CBC W/AUTO DIFF WBC: CPT

## 2022-03-29 PROCEDURE — 71250 CT THORAX DX C-: CPT

## 2022-03-29 PROCEDURE — 99223 1ST HOSP IP/OBS HIGH 75: CPT | Performed by: INTERNAL MEDICINE

## 2022-03-29 RX ORDER — INSULIN LISPRO 100 [IU]/ML
0-12 INJECTION, SOLUTION INTRAVENOUS; SUBCUTANEOUS
Status: DISCONTINUED | OUTPATIENT
Start: 2022-03-29 | End: 2022-03-30

## 2022-03-29 RX ORDER — INSULIN LISPRO 100 [IU]/ML
0-6 INJECTION, SOLUTION INTRAVENOUS; SUBCUTANEOUS NIGHTLY
Status: DISCONTINUED | OUTPATIENT
Start: 2022-03-29 | End: 2022-03-30

## 2022-03-29 RX ORDER — INSULIN LISPRO 100 [IU]/ML
5 INJECTION, SOLUTION INTRAVENOUS; SUBCUTANEOUS
Status: DISCONTINUED | OUTPATIENT
Start: 2022-03-29 | End: 2022-03-30 | Stop reason: HOSPADM

## 2022-03-29 RX ORDER — INSULIN LISPRO 100 [IU]/ML
5 INJECTION, SOLUTION INTRAVENOUS; SUBCUTANEOUS ONCE
Status: COMPLETED | OUTPATIENT
Start: 2022-03-29 | End: 2022-03-29

## 2022-03-29 RX ADMIN — CARVEDILOL 25 MG: 25 TABLET, FILM COATED ORAL at 09:19

## 2022-03-29 RX ADMIN — SODIUM CHLORIDE, PRESERVATIVE FREE 10 ML: 5 INJECTION INTRAVENOUS at 09:18

## 2022-03-29 RX ADMIN — BISACODYL 5 MG: 5 TABLET, COATED ORAL at 06:36

## 2022-03-29 RX ADMIN — INSULIN LISPRO 1 UNITS: 100 INJECTION, SOLUTION INTRAVENOUS; SUBCUTANEOUS at 20:58

## 2022-03-29 RX ADMIN — INSULIN LISPRO 5 UNITS: 100 INJECTION, SOLUTION INTRAVENOUS; SUBCUTANEOUS at 18:53

## 2022-03-29 RX ADMIN — HEPARIN SODIUM 5000 UNITS: 5000 INJECTION INTRAVENOUS; SUBCUTANEOUS at 05:02

## 2022-03-29 RX ADMIN — SODIUM CHLORIDE, PRESERVATIVE FREE 10 ML: 5 INJECTION INTRAVENOUS at 21:00

## 2022-03-29 RX ADMIN — CARVEDILOL 25 MG: 25 TABLET, FILM COATED ORAL at 20:53

## 2022-03-29 RX ADMIN — ACETAMINOPHEN 650 MG: 325 TABLET ORAL at 17:10

## 2022-03-29 RX ADMIN — INSULIN LISPRO 5 UNITS: 100 INJECTION, SOLUTION INTRAVENOUS; SUBCUTANEOUS at 13:44

## 2022-03-29 RX ADMIN — HEPARIN SODIUM 5000 UNITS: 5000 INJECTION INTRAVENOUS; SUBCUTANEOUS at 20:53

## 2022-03-29 RX ADMIN — INSULIN GLARGINE 20 UNITS: 100 INJECTION, SOLUTION SUBCUTANEOUS at 20:57

## 2022-03-29 RX ADMIN — INSULIN LISPRO 2 UNITS: 100 INJECTION, SOLUTION INTRAVENOUS; SUBCUTANEOUS at 09:17

## 2022-03-29 RX ADMIN — HEPARIN SODIUM 5000 UNITS: 5000 INJECTION INTRAVENOUS; SUBCUTANEOUS at 14:50

## 2022-03-29 RX ADMIN — AMLODIPINE BESYLATE 10 MG: 10 TABLET ORAL at 09:19

## 2022-03-29 RX ADMIN — ASPIRIN 81 MG: 81 TABLET, CHEWABLE ORAL at 09:19

## 2022-03-29 RX ADMIN — ATORVASTATIN CALCIUM 40 MG: 40 TABLET, FILM COATED ORAL at 20:53

## 2022-03-29 RX ADMIN — ACETAMINOPHEN 650 MG: 325 TABLET ORAL at 04:57

## 2022-03-29 RX ADMIN — INSULIN LISPRO 5 UNITS: 100 INJECTION, SOLUTION INTRAVENOUS; SUBCUTANEOUS at 09:17

## 2022-03-29 ASSESSMENT — PAIN DESCRIPTION - PROGRESSION
CLINICAL_PROGRESSION: GRADUALLY WORSENING
CLINICAL_PROGRESSION: GRADUALLY WORSENING

## 2022-03-29 ASSESSMENT — PAIN SCALES - GENERAL
PAINLEVEL_OUTOF10: 0
PAINLEVEL_OUTOF10: 0
PAINLEVEL_OUTOF10: 3
PAINLEVEL_OUTOF10: 0
PAINLEVEL_OUTOF10: 8
PAINLEVEL_OUTOF10: 0

## 2022-03-29 ASSESSMENT — PAIN DESCRIPTION - ONSET
ONSET: PROGRESSIVE
ONSET: PROGRESSIVE

## 2022-03-29 ASSESSMENT — PAIN DESCRIPTION - PAIN TYPE
TYPE: ACUTE PAIN
TYPE: ACUTE PAIN

## 2022-03-29 ASSESSMENT — ENCOUNTER SYMPTOMS
CHEST TIGHTNESS: 0
SHORTNESS OF BREATH: 0
VOMITING: 0
ABDOMINAL PAIN: 0
SORE THROAT: 0
DIARRHEA: 0
COUGH: 0
APNEA: 0
CONSTIPATION: 0
NAUSEA: 0

## 2022-03-29 ASSESSMENT — PAIN DESCRIPTION - ORIENTATION
ORIENTATION: RIGHT;ANTERIOR
ORIENTATION: RIGHT;ANTERIOR

## 2022-03-29 ASSESSMENT — PAIN - FUNCTIONAL ASSESSMENT
PAIN_FUNCTIONAL_ASSESSMENT: PREVENTS OR INTERFERES SOME ACTIVE ACTIVITIES AND ADLS
PAIN_FUNCTIONAL_ASSESSMENT: PREVENTS OR INTERFERES SOME ACTIVE ACTIVITIES AND ADLS

## 2022-03-29 ASSESSMENT — PAIN DESCRIPTION - LOCATION
LOCATION: HEAD
LOCATION: HEAD

## 2022-03-29 ASSESSMENT — PAIN DESCRIPTION - DESCRIPTORS
DESCRIPTORS: HEADACHE
DESCRIPTORS: HEADACHE

## 2022-03-29 NOTE — CONSULTS
Pulmonology Consult Note  PGY 3  Internal Medicine    Chief Complaint: Headache    History Obtained From:  patient    HISTORY OF PRESENT ILLNESS:   Ms. Zoran Red is a 64year-old female with a PMHx of Afib, CKD stage II, type II diabetes, hypertension, hyperlipidemia and HFpEF who presents to the hospital with a headache and shakes. She has also been experiencing increased thirst, urination and her glucometer has been reading high for the past few days. Recent Hb A1C's have been in the 14s. Blood sugar on presentation was 717. She was admitted for hyperglycemia and tremors. During the workup an abdominal CT was done that showed a pulmonary nodule in the right lower lobe. Pulmonology has been consulted for a nodule. The patient denies cough, but has had weight loss. She is a long time smoker who quit 5 years ago. Has no other known lung problems. PAST HISTORY:     Past Medical History:        Diagnosis Date    Atrial fibrillation (HCC)     CKD (chronic kidney disease) stage 3, GFR 30-59 ml/min (HCC)     Diabetes mellitus type II     Diastolic CHF, chronic (HCC)     HTN (hypertension)     Hyperlipidemia     Tobacco abuse    ·     Past Surgical History:        Procedure Laterality Date    EYE SURGERY Right    ·     Medications Priorto Admission:    · Medications Prior to Admission: amLODIPine (NORVASC) 10 MG tablet, TAKE 1 TABLET BY MOUTH ONE TIME A DAY  · ezetimibe (ZETIA) 10 MG tablet, TAKE 1 TABLET BY MOUTH ONE TIME A DAY  · Dulaglutide (TRULICITY) 1.5 HQ/9.3OP SOPN, Inject 1.5 mg into the skin once a week  · insulin glargine (BASAGLAR KWIKPEN) 100 UNIT/ML injection pen, Inject 20 Units into the skin nightly  · blood glucose monitor kit and supplies, Dispense sufficient amount for indicated testing frequency plus additional to accommodate PRN testing needs. Dispense all needed supplies to include: monitor, strips, lancing device, lancets, control solutions, alcohol swabs.   · carvedilol (COREG) 25 MG tablet, take 1 tablet by mouth 2 times a day with meals  · lisinopril (PRINIVIL;ZESTRIL) 40 MG tablet, TAKE ONE TABLET BY MOUTH ONE TIME DAILY  · atorvastatin (LIPITOR) 40 MG tablet, Take 1 tablet by mouth daily  · dapagliflozin (FARXIGA) 10 MG tablet, Take 1 tablet by mouth every morning  · glimepiride (AMARYL) 4 MG tablet, Take 1 tablet by mouth every morning (before breakfast)  · Handicap Placard MISC, by Does not apply route Start date: 10/27/21 End date 10/26/26  · furosemide (LASIX) 40 MG tablet, TAKE 1 TABLET BY MOUTH ONE TIME A DAY  · Calcium-Vitamin D 600-200 MG-UNIT TABS, Take 1 tablet by mouth 2 times daily  · aspirin (ASPIRIN CHILDRENS) 81 MG chewable tablet, Take 1 tablet by mouth daily  · vitamin D3 (CHOLECALCIFEROL) 25 MCG (1000 UT) TABS tablet, Take 1 tablet by mouth daily    Allergies:  Patient has no known allergies. Social History:   · TOBACCO:   reports that she quit smoking about 6 years ago. Her smoking use included cigarettes. She has a 16.50 pack-year smoking history. She has quit using smokeless tobacco.  Her smokeless tobacco use included snuff and chew. · ETOH:   reports previous alcohol use. · DRUGS : none  · Patient currently lives with family at home    Family History:       Problem Relation Age of Onset    Heart Attack Mother     Heart Failure Mother     Cancer Father         lung    Kidney Disease Sister     Heart Disease Sister     Diabetes Brother     High Blood Pressure Brother     Heart Failure Brother    ·     Review of Systems   Constitutional: Negative for activity change, appetite change, chills, diaphoresis, fever and unexpected weight change. HENT: Negative for congestion and sore throat. Eyes: Negative for visual disturbance. Respiratory: Negative for apnea, cough, chest tightness and shortness of breath. Cardiovascular: Negative for chest pain, palpitations and leg swelling.    Gastrointestinal: Negative for abdominal pain, constipation, diarrhea, nausea and vomiting. Endocrine: Positive for polyuria. Negative for cold intolerance, heat intolerance and polydipsia. Genitourinary: Negative for difficulty urinating. Musculoskeletal: Negative for arthralgias and myalgias. Neurological: Positive for tremors and headaches. Negative for weakness. Psychiatric/Behavioral: Negative for confusion and sleep disturbance. All other systems reviewed and are negative. ROS: A 10 point review of systems was conducted, significant findings as noted in HPI. Physical Exam:     Vitals:    03/29/22 0810   BP: 131/82   Pulse: 84   Resp: 16   Temp: 97.3 °F (36.3 °C)   SpO2: 98%     Physical Exam  Vitals reviewed. Constitutional:       General: She is not in acute distress. Appearance: She is well-developed and well-groomed. HENT:      Head: Normocephalic and atraumatic. Mouth/Throat:      Mouth: Mucous membranes are moist.      Pharynx: Oropharynx is clear. Eyes:      General: No scleral icterus. Extraocular Movements: Extraocular movements intact. Conjunctiva/sclera: Conjunctivae normal.      Pupils: Pupils are equal, round, and reactive to light. Cardiovascular:      Rate and Rhythm: Normal rate and regular rhythm. Pulses: Normal pulses. Heart sounds: Normal heart sounds, S1 normal and S2 normal. No murmur heard. Pulmonary:      Effort: Pulmonary effort is normal. No respiratory distress. Breath sounds: Normal breath sounds and air entry. Abdominal:      General: Abdomen is flat. Bowel sounds are normal.      Palpations: Abdomen is soft. Tenderness: There is no abdominal tenderness. Musculoskeletal:         General: Normal range of motion. Cervical back: Full passive range of motion without pain, normal range of motion and neck supple. Skin:     General: Skin is warm and dry. Neurological:      General: No focal deficit present.       Mental Status: She is alert and oriented to person, place, and time. Cranial Nerves: Cranial nerves are intact. Sensory: Sensation is intact. Motor: Motor function is intact. Coordination: Coordination is intact. Gait: Gait is intact. Deep Tendon Reflexes: Reflexes are normal and symmetric. Psychiatric:         Attention and Perception: Attention and perception normal.         Mood and Affect: Mood normal.         Speech: Speech normal.         Behavior: Behavior normal. Behavior is cooperative. Thought Content: Thought content normal.         Cognition and Memory: Cognition and memory normal.         Judgment: Judgment normal.         Data:   Labs:  CBC:   Recent Labs     03/28/22  1149 03/29/22  0537   WBC 5.5 5.8   HGB 10.5* 9.0*   HCT 32.0* 26.4*    215       BMP:   Recent Labs     03/28/22  1149 03/29/22  0537   * 136   K 4.6 4.0   CL 88* 104   CO2 21 22   BUN 37* 30*   CREATININE 1.9* 1.5*   GLUCOSE 717* 124*   PHOS  --  2.7     LFT's:   Recent Labs     03/28/22  1149   AST 7*   ALT <5*   BILITOT 0.3   ALKPHOS 125     Troponin:   Recent Labs     03/28/22  1149   TROPONINI <0.01     BNP:No results for input(s): BNP in the last 72 hours. ABGs: No results for input(s): PHART, UCT1ZPX, PO2ART in the last 72 hours. INR: No results for input(s): INR in the last 72 hours. U/A:  Recent Labs     03/28/22  1316 03/28/22  1316 03/28/22  1608   COLORU Yellow  --   --    PHUR 7.5   < > 7.0   WBCUA 3-5  --   --    RBCUA 0-2  --   --    BACTERIA 4+*  --   --    CLARITYU Clear  --   --    SPECGRAV 1.010  --   --    LEUKOCYTESUR Negative  --   --    UROBILINOGEN 0.2  --   --    BILIRUBINUR Negative  --   --    BLOODU TRACE-INTACT*  --   --    GLUCOSEU >=1000*  --   --     < > = values in this interval not displayed. CT ABDOMEN PELVIS WO CONTRAST Additional Contrast? Oral   Final Result   1. There is an 11 mm subpleural pulmonary nodule identified posteriorly with the right lower lobe.  This may represent pulmonary neoplasm, or granuloma. Recommend further evaluation with PET CT scan. 2. Coronary artery calcification. 3. Uncomplicated descending colon and sigmoid colon diverticulosis. 4. Small fat-containing umbilical hernia. 5. There is some mild groundglass attenuation identified within the abdominal mesentery which can be seen with a sclerosing mesenteritis. CT HEAD WO CONTRAST   Final Result      No acute intracranial abnormality. No significant change from prior study. XR CHEST (2 VW)   Final Result   1. No evidence of acute cardiopulmonary disease. CT CHEST WO CONTRAST    (Results Pending)         ASSESSMENT AND PLAN:   Stepan Taveras is a 72F with a newly found pulmonary nodule. Pulmonary Nodule. CT of abdomen showed a 11 mm subpleural pulmonary nodule identified posteriorly with the right lower lobe. This may represent pulmonary neoplasm, or granuloma. She has a significant smoking history and has never had prior imagining of her chest. No cough or other lung issues in the past.  · Will get a CT chest now to look for other nodules or nodes  · If nothing else concerning is found, patient can complete the rest of her workout as an outpatient. · Patient to follow-up with as an outpatient with Dr. Atkinson Her to continue the       This patient has been staffed and discussed with Dr. Atkinson Her  -----------------------------  Cezar Molina MD, PGY-3  3/29/2022  10:24 AM    Pulm    Patient seen and examined. I agree with Dr. De La Cruz's history, physical, lab findings, assessment and plan. I was consulted for incidental 11 mm right lower lobe subpleural pulmonary nodule found on CT of the abdomen performed for abdominal distention. Yazmin Peacock has no history of any chronic lung disease nor has she ever seen a pulmonologist.  She previously smoked but she was a little bit elusive as to amount. She said she is smoked on and off since age of 23 approximately 1 to 2 packs/week.   She states she did not smoke more because cigarettes were too expensive. She has been a lifelong resident of [place]. Her dad had lung cancer and asbestosis. She has no pulmonary complaints    Will obtain CT chest to assess for other pulmonary nodules or masses as well as mediastinal hilar lymphadenopathy. Further work-up based on that imaging. At a minimum I would like to obtain an outpatient PET scan to assess functional status of 11 millimeters right lower lobe pulmonary nodule.   If there is a more significant finding on CT chest and obviously we would target that    From pulmonary perspective she does not required continued hospitalization for work-up of pulmonary nodule    Patricia Roland MD

## 2022-03-29 NOTE — PROGRESS NOTES
Physician Progress Note      PATIENTMaradha Michele  CSN #:                  [id number]  :                       1945  ADMIT DATE:       3/28/2022 10:49 AM  DISCH DATE:  RESPONDING  PROVIDER #:        YEHUDA Mata TEXT:    Pt admitted with hyperglycemia. Pt noted to have history of CHF. If possible,   please document in progress notes and discharge summary if you are evaluating   and/or treating any of the following: The medical record reflects the following:  Risk Factors: 65 yo w/ hyperglycemia, hx of diastolic CHF  Clinical Indicators: Per H&P PMH: 76yoF with a PMHx paroxysmal Afib, CKD stage   3, DM2, HTN, HFpEF.  ECHO 2019: EF 55-60%. No regional wall motion   abnormalities are noted. Diastolic filling parameters suggests grade II   diastolic dysfunction. Increased LVEDP. Treatment: Home dose of Coreg 25mg PO 2X daily  Options provided:  -- Chronic Diastolic CHF/HFpEF  -- Other - I will add my own diagnosis  -- Disagree - Not applicable / Not valid  -- Disagree - Clinically unable to determine / Unknown  -- Refer to Clinical Documentation Reviewer    PROVIDER RESPONSE TEXT:    This patient has chronic diastolic CHF/HFpEF.     Query created by: Axel Mendez on 3/29/2022 6:39 AM      Electronically signed by:  Digna Powers 3/29/2022 6:54 AM

## 2022-03-29 NOTE — PROGRESS NOTES
Progress Note    Admit Date: 3/28/2022  Day: 2  Diet: ADULT DIET; Regular; 3 carb choices (45 gm/meal); Low Fat/Low Chol/High Fiber/2 gm Na    CC: Headache, tremors. Interval history:   Patient seen at bedside, no acute events overnight. Sugars have come down from 700 range to 100's this am. Patient endorses she feels much improved, states her tremors are gone and that she does not have acute pain anywhere. She endorses good appetite and is eating her breakfast at time of my interview. On ROS, she denies fever/chills, nausea/vomiting, abdominal pain, acute urinary abnormalities. She endorsed constipation overnight for which she received a laxative. HPI:     Renne Kawasaki is a 75yoF with a PMHx paroxysmal Afib, CKD stage 3, DM2, HTN, HLD, HFpEF who presented with HA. Patient reports the headache as right sided, dull and achy. Around the same time she has been experiencing \"shaking\", increased thirst and urination. Patient reports her glucometer has read her sugar as \"high\" over the past few days. She states she is compliance with her medications. She is on trulicity, farxiga and takes glargine 20 units nightly at home. Patient's hemoglobin a1c has been 14.0 on the last few checks.      Patient reports a non-productive cough for the past few days but denies any f/c, SOB, CP, palpitations, abdominal pain, N/V or urinary symptoms. Denies any weight loss but appears to have lost close to 30 lb in last year. Hx of smoking a pack a week for a few years but quit about 6 years ago. Drinks socially. Denies recreational drug use.  Lives at home is is independent.        Medications:     Scheduled Meds:   insulin lispro  5 Units SubCUTAneous TID WC    insulin lispro  0-12 Units SubCUTAneous TID WC    insulin lispro  0-6 Units SubCUTAneous Nightly    amLODIPine  10 mg Oral Daily    aspirin  81 mg Oral Daily    atorvastatin  40 mg Oral Daily    carvedilol  25 mg Oral BID    sodium chloride flush  5-40 mL IntraVENous 2 times per day    heparin (porcine)  5,000 Units SubCUTAneous 3 times per day    insulin glargine  20 Units SubCUTAneous Nightly     Continuous Infusions:   sodium chloride      dextrose       PRN Meds:bisacodyl, sodium chloride flush, sodium chloride, ondansetron **OR** ondansetron, polyethylene glycol, acetaminophen **OR** acetaminophen, glucose, dextrose bolus (hypoglycemia), glucagon (rDNA), dextrose    Objective:   Vitals:   T-max:  Patient Vitals for the past 8 hrs:   BP Temp Temp src Pulse Resp SpO2 Weight   03/29/22 0810 131/82 97.3 °F (36.3 °C) Oral 84 16 98 % --   03/29/22 0524 -- -- -- -- -- -- 183 lb 6.8 oz (83.2 kg)   03/29/22 0415 120/82 98.5 °F (36.9 °C) Oral 80 14 96 % --       Intake/Output Summary (Last 24 hours) at 3/29/2022 0902  Last data filed at 3/29/2022 0602  Gross per 24 hour   Intake 380 ml   Output 300 ml   Net 80 ml     Review of Systems   Per Interval hx. Physical Exam  Constitutional:       Appearance: Normal appearance. HENT:      Head: Normocephalic and atraumatic. Nose: Nose normal.      Mouth/Throat:      Mouth: Mucous membranes are moist.      Pharynx: Oropharynx is clear. Eyes:      Comments: R eye drooped. Poor vision. Cardiovascular:      Rate and Rhythm: Normal rate and regular rhythm. Pulses: Normal pulses. Heart sounds: Normal heart sounds. Pulmonary:      Effort: Pulmonary effort is normal. No respiratory distress. Breath sounds: No wheezing, rhonchi or rales. Comments: LUZ MARIA with reduced breath sounds. Abdominal:      General: Abdomen is flat. Bowel sounds are normal.      Palpations: Abdomen is soft. Tenderness: There is no abdominal tenderness. Musculoskeletal:         General: Normal range of motion. Cervical back: Normal range of motion and neck supple. Skin:     General: Skin is warm and dry. Capillary Refill: Capillary refill takes less than 2 seconds.    Neurological:      General: No focal deficit present. Mental Status: She is alert and oriented to person, place, and time. Mental status is at baseline. LABS:    CBC:   Recent Labs     03/28/22  1149 03/29/22  0537   WBC 5.5 5.8   HGB 10.5* 9.0*   HCT 32.0* 26.4*    215   MCV 83.5 80.7     Renal:    Recent Labs     03/28/22  1149 03/28/22  1600 03/29/22  0537   *  --  136   K 4.6  --  4.0   CL 88*  --  104   CO2 21  --  22   BUN 37*  --  30*   CREATININE 1.9*  --  1.5*   GLUCOSE 717*  --  124*   CALCIUM 9.5  --  9.0   MG  --  2.10 2.00   PHOS  --   --  2.7   ANIONGAP 13  --  10     Hepatic:   Recent Labs     03/28/22  1149 03/29/22  0537   AST 7*  --    ALT <5*  --    BILITOT 0.3  --    BILIDIR <0.2  --    PROT 7.7  --    LABALBU 3.8 3.2*   ALKPHOS 125  --      Troponin:   Recent Labs     03/28/22  1149   TROPONINI <0.01     BNP: No results for input(s): BNP in the last 72 hours. Lipids: No results for input(s): CHOL, HDL in the last 72 hours. Invalid input(s): LDLCALCU, TRIGLYCERIDE  ABGs:  No results for input(s): PHART, XXF7ADP, PO2ART, CYC7YXU, BEART, THGBART, I2GLSZJQ, OFB6TOM in the last 72 hours. INR: No results for input(s): INR in the last 72 hours. Lactate: No results for input(s): LACTATE in the last 72 hours. Cultures:  -----------------------------------------------------------------  RAD:   CT ABDOMEN PELVIS WO CONTRAST Additional Contrast? Oral   Final Result   1. There is an 11 mm subpleural pulmonary nodule identified posteriorly with the right lower lobe. This may represent pulmonary neoplasm, or granuloma. Recommend further evaluation with PET CT scan. 2. Coronary artery calcification. 3. Uncomplicated descending colon and sigmoid colon diverticulosis. 4. Small fat-containing umbilical hernia. 5. There is some mild groundglass attenuation identified within the abdominal mesentery which can be seen with a sclerosing mesenteritis.              CT HEAD WO CONTRAST   Final Result      No acute intracranial abnormality. No significant change from prior study. XR CHEST (2 VW)   Final Result   1. No evidence of acute cardiopulmonary disease. Assessment/Plan:   Cathy Dorado is a 75yoF with a PMHx paroxysmal Afib, CKD stage 3, DM2, HTN, HLD, HFpEF who presented with HA, shaking, increased thirst and urination in the setting of high sugar readings at home. Hyperglycemia   Patient glucose was 717 on admission. UA showed glucose >1000, trace ketones and protein. Last hemoglobin a1c was 14.   - Lantus 20U nightly  - Lispro 5U TID   - HDSSI   - continue Glucose checks q4h  - Endocrine c/s as outpatient      Lung Nodule   CTAP shows 11 mm x 7 mm noncalcified pulmonary nodule identified posteriorly within the right lower lobe. Family history of cancers, pts father passed from lung ca.    - Pulm c/s - appreciate reccs    CKD 3  Cr fluctuates and was 2.2 on 10/2021.   - daily BMP     Chronic Medical Conditions:  Afib -  Coreg 25 BID   HTN - continue Amlodipine 10 mg daily  HLD - continue Atorvastatin      Code Status: FULL CODE  FEN: Adult Diet; Carb Control  PPX: SQH  DISPO: Marco Flores MD, PGY-1  03/29/22  9:02 AM    This patient has been staffed and discussed with Soheila Neumann MD.

## 2022-03-29 NOTE — CARE COORDINATION
Case Management Assessment           Initial Evaluation                Date / Time of Evaluation: 3/29/2022 10:56 AM                 Assessment Completed by: Cuco Zuniga RN    Patient Name: Jose E Lares     YOB: 1945  Diagnosis: Hyponatremia [E87.1]  Coarse tremors [G25.2]  Hyperglycemia [R73.9]     Date / Time: 3/28/2022 10:49 AM    Patient Admission Status: Inpatient    If patient is discharged prior to next notation, then this note serves as note for discharge by case management. Current PCP: Forrest Valencia MD  Clinic Patient: Yes    Chart Reviewed: Yes  Patient/ Family Interviewed: Yes    Initial assessment completed at bedside with: patient    Hospitalization in the last 30 days: No    Emergency Contacts:  Extended Emergency Contact Information  Primary Emergency Contact: Gage Salazar, [address] [facility] Phone: [phone]  Work Phone: [phone]  Relation: Child  Secondary Emergency Contact: Garces Press, [address] [place] [address] Phone: [phone]  Relation: Child    Advance Directives:   Code Status: [address]: No      Financial  Payor: MEDICARE / Plan: MEDICARE PART A AND B / Product Type: *No Product type* /     Pre-cert required for SNF: No    Pharmacy    [place] [address] [address] [facility] [address] [address] Yelena Sheth [phone] - F [phone]  [address] [facility] [address]  Phone: [phone] Fax: [phone]      Potential assistance Purchasing Medications: Potential Assistance Purchasing Medications: No  Does Patient want to participate in local refill/ meds to beds program?:      Meds To Beds General Rules:  1. Can ONLY be done Monday- Friday between 8:30am-5pm  2. Prescription(s) must be in pharmacy by 3pm to be filled same day  3. Copy of patient's insurance/ prescription drug card and patient face sheet must be sent along with the prescription(s)  4. Cost of Rx cannot be added to hospital bill.  If financial assistance is needed, please contact unit  or ;  or  CANNOT provide pharmacy voucher for patients co-pays  5. Patients can then  the prescription on their way out of the hospital at discharge, or pharmacy can deliver to the bedside if staff is available. (payment due at time of pick-up or delivery - cash, check, or card accepted)     Able to afford home medications/ co-pay costs: Yes    ADLS  Support Systems: Family Members,Friends/Neighbors,Children    PT AM-PAC: 18 /24  OT AM-PAC:   /24    [place]: 2 story  Steps: 3    Plans to RETURN to current housing: Yes  Barriers to RETURNING to current housing: none    Aleena Alexander 76  Currently ACTIVE with [address]: No  Home Care Agency: Not Applicable    Currently ACTIVE with [place] on Aging: Yes  Passport/ Waiver: No  Passport/ Waiver Services: Meals on [employer]  DME Provider: n/a  Equipment: [address] prior to admission: No  [address]: Not Applicable      Dialysis  Active with HD/PD prior to admission: No  Nephrologist:     HD Center:  Not Applicable    DISCHARGE PLAN:  Disposition: Home- No Services Needed    Transportation PLAN for discharge: family     Factors facilitating achievement of predicted outcomes: Family support and Cooperative    Barriers to discharge: Medical complications    Additional Case Management Notes:   Patient is from home with grand son. Patient does not drive, but states she is independent at home, no DME needs. Patient declines [facility] at this time. Patient's family to transport home.     The Plan for Transition of Care is related to the following treatment goals of Hyponatremia [E87.1]  Coarse tremors [G25.2]  Hyperglycemia [R73.9]    The Patient and/or patient representative Brenda Juan and her family were provided with a choice of provider and agrees with the discharge plan Yes    Freedom of choice list was provided with basic dialogue that supports the patient's individualized plan of care/goals and shares the quality data associated with the providers.  Yes    Care Transition patient: Yes    Fani Schaeffer RN  [facility]  Case Management Department  Ph: [phone]   Fax: [phone]

## 2022-03-29 NOTE — PROGRESS NOTES
Physical Therapy    Facility/Department: [place] [address] [facility]  Initial Assessment    NAME: Stan Reis  : 1945  MRN: [id number]    Date of Service: 3/29/2022    Discharge Recommendations: Stan Reis scored a 18/24 on the AM-PAC short mobility form. Current research shows that an AM-PAC score of 18 or greater is typically associated with a discharge to the patient's home setting. Based on the patient's AM-PAC score and their current functional mobility deficits, it is recommended that the patient have 2-3 sessions per week of Physical Therapy at d/c to increase the patient's independence. At this time, this patient demonstrates the endurance and safety to discharge home with home PT and a follow up treatment frequency of 2-3x/wk. Please see assessment section for further patient specific details. If patient discharges prior to next session this note will serve as a discharge summary. Please see below for the latest assessment towards goals. PT Equipment Recommendations  Equipment Needed: No    Assessment   Body structures, Functions, Activity limitations: Decreased functional mobility ; Decreased vision/visual deficit; Decreased balance  Assessment: pt is a 63 yo female independent PTA admitted for hyperglycemia presenting close to baseline function performing all mobility with SBA- CGA with no AD. pt limited by visual deficits requiring occasional verbal cues to redirect and prevent running into objects. pt has no concerns for returning home and has family to assist as needed. Recommend initial 24hr care and home PT to maximize safety with functional mobility. will follow throughout stay  Treatment Diagnosis: dec functional mobility  Prognosis: Good  Decision Making: Medium Complexity  PT Education: PT Role;Goals;Plan of Care;General Safety; Functional Mobility Training  Patient Education: pt will benefit from reinforcement  REQUIRES PT FOLLOW UP: Yes  Activity Tolerance  Activity Tolerance: Patient Tolerated treatment well       Patient Diagnosis(es): The primary encounter diagnosis was Hyperglycemia. Diagnoses of Coarse tremors and Hyponatremia were also pertinent to this visit. has a past medical history of Atrial fibrillation ([address] CKD (chronic kidney disease) stage 3, GFR 30-59 ml/min (HCC), Diabetes mellitus type II, Diastolic CHF, chronic ([address] HTN (hypertension), Hyperlipidemia, and Tobacco abuse.   has a past surgical history that includes eye surgery (Right). Restrictions  Position Activity Restriction  Other position/activity restrictions: up as tolerated  Vision/Hearing  Vision: Impaired  Vision Exceptions:  (blind right eye)  Hearing: Within functional limits     Subjective  General  Chart Reviewed:  Yes  Additional Pertinent Hx: pt is a 65 yo female presenting with shakiness and HA found to be hyperglycemic upon arrival  Referring Practitioner: Janet Ramos MD  Diagnosis: hyperglycemia  Follows Commands: Within Functional Limits  Subjective  Subjective: pt supine in bed and agreeable toPT  Pain Screening  Patient Currently in Pain: Denies  Vital Signs  Patient Currently in Pain: Denies       Orientation  Orientation  Overall Orientation Status: Within Functional Limits  Social/Functional History  Social/Functional History  Lives With: Family (grandson 32 years, daughter and grandson can check in throughout the day)  Type of Home: House  Home Layout: Able to Live on Main level with bedroom/bathroom,Two level  Home Access: Stairs to enter with rails  Entrance Stairs - Number of Steps: 3  Bathroom Shower/Tub: Walk-in shower  Bathroom Toilet: Standard  Bathroom Equipment: Shower chair  Home Equipment:  (none)  ADL Assistance: Independent  Homemaking Assistance: Needs assistance (family)  Ambulation Assistance: Independent  Transfer Assistance: Independent  Active : No  Patient's  Info: daughter  Occupation: Retired  Additional Comments: reporting no falls  Cognition        Objective Strength RLE  Strength RLE: WFL  Strength LLE  Strength LLE: WFL        Bed mobility  Supine to Sit: Stand by assistance  Scooting: Stand by assistance  Transfers  Sit to Stand: Stand by assistance  Stand to sit: Stand by assistance  Comment: with no AD from EOB, recliner, and toilet  Ambulation  Ambulation?: Yes  Ambulation 1  Surface: level tile  Device: No Device  Assistance: Contact guard assistance  Quality of Gait: steady gait with no LOB, dec lindsay with short step length, pt 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 and has visual deficits in L as well causing pt to reach out for objects to prevent running into things  Gait Deviations: Slow Lindsay; Shuffles;Decreased step length  Distance: 100' + 10'x2  Stairs/Curb  Stairs?: Yes  Stairs  # Steps : 3  Stairs Height: 6\"  Rails: Right ascending  Assistance: Contact guard assistance  Comment: step to pattern     Balance  Posture: Fair  Sitting - Static: Fair;+  Sitting - Dynamic: Fair  Standing - Static: Fair  Standing - Dynamic: Fair  Comments: standing balance SBA at sink with hand washing and CGA at toilet with pericare        Plan   Plan  Times per week: 2-5  Current Treatment Recommendations: Darin Zurita Re-education,Patient/Caregiver Education & Training,Balance Training,Gait Training,Home Exercise Program,Functional Mobility Training,Stair training,Safety Education & Training  Safety Devices  Type of devices: Left in chair,Call light within reach,Chair alarm in place,Gait belt,Nurse notified    G-Code       OutComes Score                                                  AM-PAC Score  AM-PAC Inpatient Mobility Raw Score : 18 (03/29/22 1055)  AM-PAC Inpatient T-Scale Score : 43.63 (03/29/22 1055)  Mobility Inpatient CMS 0-100% Score: 46.58 (03/29/22 1055)  Mobility Inpatient CMS G-Code Modifier : CK (03/29/22 1055)          Goals  Short term goals  Time Frame for Short term goals: by dc  Short term goal 1: pt will perform bed mobility with indep  Short term goal 2: pt will perform functional transfers mod I  Short term goal 3: pt will ambulate 150' with LRAD and mod I  Short term goal 4: pt will negotiate 3 steps with one HR and Sup  Patient Goals   Patient goals : to go home       Therapy Time   Individual Concurrent Group Co-treatment   Time In 0820         Time Out 0858         Minutes 38              Timed Code Treatment Minutes:  23 min     Total Treatment Minutes:  38 min       Shanna Wheatley, PT

## 2022-03-29 NOTE — PROGRESS NOTES
Occupational Therapy   Occupational Therapy Initial Assessment and Tx  Date: 3/29/2022   Patient Name: Cathy Dorado  MRN: [id number]     : 1945    Date of Service: 3/29/2022    Discharge Recommendations: Cathy Dorado scored a 18/24 on the AM-PAC ADL Inpatient form. Current research shows that an AM-PAC score of 18 or greater is typically associated with a discharge to the patient's home setting. Based on the patient's AM-PAC score, and their current ADL deficits, it is recommended that the patient have 2-3 sessions per week of Occupational Therapy at d/c to increase the patient's independence. At this time, this patient demonstrates the endurance and safety to discharge home with  (home vs OP services) and a follow up treatment frequency of 2-3x/wk. Please see assessment section for further patient specific details. If patient discharges prior to next session this note will serve as a discharge summary. Please see below for the latest assessment towards goals. OT Equipment Recommendations  Equipment Needed: No    Assessment   Performance deficits / Impairments: Decreased functional mobility ; Decreased ADL status; Decreased endurance;Decreased vision/visual deficit  Assessment: From home with family, has assist prn, reporting can have increased assist at NH. Anticipate pt will improved when in her own environment 2/2 visual deficits. Pt reporting blind in R eye however appears to have significant deficits in both eyes requiring increased assist and verbal cues this date. Pt completing mobility with CGA no AD, unsteady at times but no significant LOB. Pt completing toileting, LB dressing, ADLs with assist. Pt would benefit from cont OT while in acute care. Rec 24hr at NH. Treatment Diagnosis: impaired mobility, transfers, ADL  Decision Making: Low Complexity  OT Education: OT Role;Plan of Care;ADL Adaptive Strategies;Transfer Training; Low Vision Education  Patient Education: cont to reinforce  Barriers to Learning: visual  REQUIRES OT FOLLOW UP: Yes  Activity Tolerance  Activity Tolerance: Patient Tolerated treatment well  Activity Tolerance: limited by vision  Safety Devices  Safety Devices in place: Yes  Type of devices: All fall risk precautions in place;Call light within reach; Chair alarm in place;Gait belt;Patient at risk for falls; Left in chair;Nurse notified           Patient Diagnosis(es): The primary encounter diagnosis was Hyperglycemia. Diagnoses of Coarse tremors and Hyponatremia were also pertinent to this visit. has a past medical history of Atrial fibrillation ([address] CKD (chronic kidney disease) stage 3, GFR 30-59 ml/min (HCC), Diabetes mellitus type II, Diastolic CHF, chronic ([address] HTN (hypertension), Hyperlipidemia, and Tobacco abuse.   has a past surgical history that includes eye surgery (Right). Treatment Diagnosis: impaired mobility, transfers, ADL      Restrictions  Position Activity Restriction  Other position/activity restrictions: up as tolerated    Subjective   General  Chart Reviewed: Yes  Additional Pertinent Hx: 76yoF with a PMHx paroxysmal Afib, CKD stage 3, DM2, HTN, HLD, HFpEF who presented with HA. Patient reports the headache as right sided, dull and achy. Around the same time she has been experiencing \"shaking\", increased thirst and urination. Patient reports her glucometer has read her sugar as \"high\" over the past few days. Daughter states that patient lives at home and is independent, cooks and cleans the house and generally takes care of herself and knows her medications. Over the last few days they have noticed she has been complaining of a headache and has been \"shaking all over \"and this is very unusual for her so they brought her here. Referring Practitioner: Girish Martinez MD  Diagnosis: hyponatremia  Subjective  Subjective:  In bed agreeable reporting no pain  Patient Currently in Pain: Denies  Pain Assessment  Pain Assessment: 0-10  Pain Level: 0  Patient's Stated Pain Goal: No pain  Vital Signs  Temp: 97.4 °F (36.3 °C)  Temp Source: Oral  Pulse: 86  Heart Rate Source: Monitor  Resp: 16  BP: 135/80  BP Location: Left upper arm  MAP (mmHg): 98  Patient Position: Semi fowlers  Level of Consciousness: Alert (0)  MEWS Score: 1  Patient Currently in Pain: Denies  Oxygen Therapy  SpO2: 100 %  Pulse Oximeter Device Mode: Intermittent  Pulse Oximeter Device Location: Finger  O2 Device: None (Room air)  Social/Functional History  Social/Functional History  Lives With: Family (grandson 32 years, daughter and grandson can check in throughout the day)  Type of Home: House  Home Layout: Able to Live on Main level with bedroom/bathroom,Two level  Home Access: Stairs to enter with rails  Entrance Stairs - Number of Steps: 3  Bathroom Shower/Tub: Walk-in shower  Bathroom Toilet: Standard  Bathroom Equipment: Shower chair  Home Equipment:  (none)  ADL Assistance: Independent  Homemaking Assistance: Needs assistance (family)  Ambulation Assistance: Independent  Transfer Assistance: Independent  Active : No  Patient's  Info: daughter  Occupation: Retired  Additional Comments: reporting no falls       Objective   Vision: Impaired  Vision Exceptions:  (blind right eye)  Hearing: Within functional limits    Orientation  Overall Orientation Status: Within Functional Limits     Balance  Sitting Balance: Supervision  Standing Balance: Contact guard assistance  Standing Balance  Activity: mobility in room, hallway, stairs, to and from bathroom  Functional Mobility  Functional - Mobility Device: No device  Activity: To/from bathroom; Other  Assist Level: Contact guard assistance  Functional Mobility Comments: CGA, unsteady at times but no significant LOB, no more than CGA, increased assist with cues 2/2 visual deficits and unfamiliar environment  Toilet Transfers  Toilet - Technique: Ambulating  Equipment Used: Standard toilet  Toilet Transfer: Contact guard assistance  ADL  Grooming: Stand by assistance;Verbal cueing; Increased time to complete (stance at sink wash hands)  LE Dressing: Contact guard assistance  Toileting: Contact guard assistance  Additional Comments: verbal cues for all, cues 2/2 visual deficits        Bed mobility  Supine to Sit: Stand by assistance  Scooting: Stand by assistance  Transfers  Sit to stand: Contact guard assistance  Stand to sit: Contact guard assistance     Cognition  Overall Cognitive Status: WFL                 LUE AROM (degrees)  LUE AROM : WFL  Left Hand AROM (degrees)  Left Hand AROM: WFL  RUE AROM (degrees)  RUE AROM : WFL  Right Hand AROM (degrees)  Right Hand AROM: WFL  LUE Strength  Gross LUE Strength: WFL  RUE Strength  Gross RUE Strength: WFL                   Plan   Plan  Times per week: 2-5  Times per day: Daily      AM-PAC Score        AM-[facility] Inpatient Daily Activity Raw Score: 18 (03/29/22 1142)  AM-PAC Inpatient ADL T-Scale Score : 38.66 (03/29/22 1142)  ADL Inpatient CMS 0-100% Score: 46.65 (03/29/22 1142)  ADL Inpatient CMS G-Code Modifier : CK (03/29/22 1142)    Goals  Short term goals  Time Frame for Short term goals: dc  Short term goal 1: supine to sit SPVN  Short term goal 2: sit<>stand SPVN  Short term goal 3: Fx mobility SPVN  Short term goal 4: UB/LB dressing SPVN  Short term goal 5: grooming SPVN in stance at sink       Therapy Time   Individual Concurrent Group Co-treatment   Time In 0820         Time Out 0858         Minutes 38           Timed Code Treatment Minutes:   23    Total Treatment Minutes:  [facility],

## 2022-03-29 NOTE — PROGRESS NOTES
Pt admitted from ED. Tele placed. Assessment and vitals collected. Pt and family oriented to unit. Fall precautions initiated.

## 2022-03-29 NOTE — PLAN OF CARE
Problem: Pain:  Goal: Pain level will decrease  Description: Pain level will decrease  Outcome: Ongoing  Note: Pt had intermittent headaches. Non-pharmaceutical methods such as rest, relaxation, and lights dimmed were recommended. Patient was given acetaminophen and was satisfied with results. Will continue to monitor pain. Problem: HEMODYNAMIC STATUS  Goal: Patient has stable vital signs and fluid balance  Outcome: Ongoing  Note: Patient was monitored for edema with mild nonpitting edema noted in the left lower extremity. Patients vitals were assessed Q4 and were WNL w/ exception of slightly elevated blood pressure. Patients lungs were assessed Q4 with diminished breath sounds but clear bilaterally throughout shift. Peripheral pulses were all +2. Skin was warm and dry and cap reflex under 3 seconds when assessed Q4. Will continue to monitor. Problem: Falls - Risk of:  Goal: Absence of physical injury  Description: Absence of physical injury  Outcome: Ongoing  Note: Pt is a Fall Risk. See Ranjan Eagle Fall Risk Score. Pt bed in low position and side rails up X2. Call light and belongings in reach. Pt has limited vision so personal items and call light placement were reviewed prior to leaving room. When ambulating directions and guide arm was given to patient to aid in ambulating safely. Pt encouraged to call for assistance. Will continue with hourly rounds for PO intake, pain needs, toileting, and repositioning as needed.

## 2022-03-30 VITALS
HEART RATE: 85 BPM | BODY MASS INDEX: 31.31 KG/M2 | HEIGHT: 64 IN | DIASTOLIC BLOOD PRESSURE: 79 MMHG | WEIGHT: 183.42 LBS | TEMPERATURE: 97.8 F | SYSTOLIC BLOOD PRESSURE: 129 MMHG | OXYGEN SATURATION: 99 % | RESPIRATION RATE: 18 BRPM

## 2022-03-30 DIAGNOSIS — R91.1 PULMONARY NODULE: Primary | ICD-10-CM

## 2022-03-30 LAB
ALBUMIN SERPL-MCNC: 3 G/DL (ref 3.4–5)
ANION GAP SERPL CALCULATED.3IONS-SCNC: 7 MMOL/L (ref 3–16)
BASOPHILS ABSOLUTE: 0 K/UL (ref 0–0.2)
BASOPHILS RELATIVE PERCENT: 0.6 %
BUN BLDV-MCNC: 31 MG/DL (ref 7–20)
CALCIUM SERPL-MCNC: 8.6 MG/DL (ref 8.3–10.6)
CHLORIDE BLD-SCNC: 107 MMOL/L (ref 99–110)
CO2: 21 MMOL/L (ref 21–32)
CREAT SERPL-MCNC: 1.8 MG/DL (ref 0.6–1.2)
EOSINOPHILS ABSOLUTE: 0.1 K/UL (ref 0–0.6)
EOSINOPHILS RELATIVE PERCENT: 1.3 %
GFR AFRICAN AMERICAN: 33
GFR NON-AFRICAN AMERICAN: 27
GLUCOSE BLD-MCNC: 130 MG/DL (ref 70–99)
GLUCOSE BLD-MCNC: 153 MG/DL (ref 70–99)
GLUCOSE BLD-MCNC: 169 MG/DL (ref 70–99)
GLUCOSE BLD-MCNC: 172 MG/DL (ref 70–99)
GLUCOSE BLD-MCNC: 218 MG/DL (ref 70–99)
HCT VFR BLD CALC: 26.5 % (ref 36–48)
HEMOGLOBIN: 9 G/DL (ref 12–16)
LYMPHOCYTES ABSOLUTE: 1.7 K/UL (ref 1–5.1)
LYMPHOCYTES RELATIVE PERCENT: 32.6 %
MAGNESIUM: 2.2 MG/DL (ref 1.8–2.4)
MCH RBC QN AUTO: 27.8 PG (ref 26–34)
MCHC RBC AUTO-ENTMCNC: 34.1 G/DL (ref 31–36)
MCV RBC AUTO: 81.5 FL (ref 80–100)
MONOCYTES ABSOLUTE: 0.4 K/UL (ref 0–1.3)
MONOCYTES RELATIVE PERCENT: 8.1 %
NEUTROPHILS ABSOLUTE: 3 K/UL (ref 1.7–7.7)
NEUTROPHILS RELATIVE PERCENT: 57.4 %
PDW BLD-RTO: 13.6 % (ref 12.4–15.4)
PERFORMED ON: ABNORMAL
PHOSPHORUS: 4 MG/DL (ref 2.5–4.9)
PLATELET # BLD: 213 K/UL (ref 135–450)
PMV BLD AUTO: 9.8 FL (ref 5–10.5)
POTASSIUM SERPL-SCNC: 4 MMOL/L (ref 3.5–5.1)
RBC # BLD: 3.25 M/UL (ref 4–5.2)
SODIUM BLD-SCNC: 135 MMOL/L (ref 136–145)
WBC # BLD: 5.2 K/UL (ref 4–11)

## 2022-03-30 PROCEDURE — 36415 COLL VENOUS BLD VENIPUNCTURE: CPT

## 2022-03-30 PROCEDURE — 6360000002 HC RX W HCPCS: Performed by: STUDENT IN AN ORGANIZED HEALTH CARE EDUCATION/TRAINING PROGRAM

## 2022-03-30 PROCEDURE — 6370000000 HC RX 637 (ALT 250 FOR IP): Performed by: STUDENT IN AN ORGANIZED HEALTH CARE EDUCATION/TRAINING PROGRAM

## 2022-03-30 PROCEDURE — 6370000000 HC RX 637 (ALT 250 FOR IP)

## 2022-03-30 PROCEDURE — 83735 ASSAY OF MAGNESIUM: CPT

## 2022-03-30 PROCEDURE — 85025 COMPLETE CBC W/AUTO DIFF WBC: CPT

## 2022-03-30 PROCEDURE — 80069 RENAL FUNCTION PANEL: CPT

## 2022-03-30 PROCEDURE — 2580000003 HC RX 258: Performed by: STUDENT IN AN ORGANIZED HEALTH CARE EDUCATION/TRAINING PROGRAM

## 2022-03-30 PROCEDURE — 99232 SBSQ HOSP IP/OBS MODERATE 35: CPT | Performed by: INTERNAL MEDICINE

## 2022-03-30 RX ORDER — INSULIN LISPRO 100 [IU]/ML
0-18 INJECTION, SOLUTION INTRAVENOUS; SUBCUTANEOUS
Status: DISCONTINUED | OUTPATIENT
Start: 2022-03-30 | End: 2022-03-30 | Stop reason: HOSPADM

## 2022-03-30 RX ORDER — INSULIN LISPRO 100 [IU]/ML
0-9 INJECTION, SOLUTION INTRAVENOUS; SUBCUTANEOUS NIGHTLY
Status: DISCONTINUED | OUTPATIENT
Start: 2022-03-30 | End: 2022-03-30 | Stop reason: HOSPADM

## 2022-03-30 RX ADMIN — HEPARIN SODIUM 5000 UNITS: 5000 INJECTION INTRAVENOUS; SUBCUTANEOUS at 05:23

## 2022-03-30 RX ADMIN — INSULIN LISPRO 5 UNITS: 100 INJECTION, SOLUTION INTRAVENOUS; SUBCUTANEOUS at 08:29

## 2022-03-30 RX ADMIN — AMLODIPINE BESYLATE 10 MG: 10 TABLET ORAL at 08:28

## 2022-03-30 RX ADMIN — CARVEDILOL 25 MG: 25 TABLET, FILM COATED ORAL at 08:28

## 2022-03-30 RX ADMIN — INSULIN LISPRO 3 UNITS: 100 INJECTION, SOLUTION INTRAVENOUS; SUBCUTANEOUS at 11:52

## 2022-03-30 RX ADMIN — INSULIN LISPRO 6 UNITS: 100 INJECTION, SOLUTION INTRAVENOUS; SUBCUTANEOUS at 08:31

## 2022-03-30 RX ADMIN — ACETAMINOPHEN 650 MG: 325 TABLET ORAL at 05:25

## 2022-03-30 RX ADMIN — INSULIN LISPRO 5 UNITS: 100 INJECTION, SOLUTION INTRAVENOUS; SUBCUTANEOUS at 11:52

## 2022-03-30 RX ADMIN — SODIUM CHLORIDE, PRESERVATIVE FREE 10 ML: 5 INJECTION INTRAVENOUS at 08:29

## 2022-03-30 RX ADMIN — ASPIRIN 81 MG: 81 TABLET, CHEWABLE ORAL at 08:28

## 2022-03-30 ASSESSMENT — PAIN SCALES - GENERAL
PAINLEVEL_OUTOF10: 0
PAINLEVEL_OUTOF10: 8
PAINLEVEL_OUTOF10: 0

## 2022-03-30 ASSESSMENT — ENCOUNTER SYMPTOMS
VOMITING: 0
NAUSEA: 0
CHEST TIGHTNESS: 0
COUGH: 0
SHORTNESS OF BREATH: 0
APNEA: 0
CONSTIPATION: 0
ABDOMINAL PAIN: 0
SORE THROAT: 0
DIARRHEA: 0

## 2022-03-30 NOTE — DISCHARGE INSTR - COC
Continuity of Care Form    Patient Name: Zoran Red   :  1945  MRN:  [id number]    Admit date:  3/28/2022  Discharge date:  ***    Code Status Order: Limited   Advance Directives:      Admitting Physician:  No admitting provider for patient encounter. PCP: Jasmine Leyva MD    Discharging Nurse: [facility] Unit/Room#: 7789/0640-55  Discharging Unit Phone Number: ***    Emergency Contact:   Extended Emergency Contact Information  Primary Emergency Contact: Maximino Stevenson, [address] [facility] Phone: [phone]  Work Phone: [phone]  Relation: Child  Secondary Emergency Contact: Renetta Correa, [address] [facility] Phone: [phone]  Relation: Child    Past Surgical History:  Past Surgical History:   Procedure Laterality Date    EYE SURGERY Right        Immunization History: There is no immunization history on file for this patient. Active Problems:  Patient Active Problem List   Diagnosis Code    Type 2 diabetes mellitus with complication, without long-term current use of insulin (HCC) E11.8    HTN (hypertension) I10    HLD (hyperlipidemia) E78.5    Paroxysmal A-fib (HCC) I48.0    Hypovitaminosis D T50.3    Diastolic CHF, chronic ([facility] I50.32    Depression F32. A    Pre-syncope R55    PAT (acute kidney injury) ([facility] N17.9    Stage 3b chronic kidney disease (HCC) N18.32    Hyperglycemia R73.9       Isolation/Infection:   Isolation            No Isolation          Patient Infection Status       None to display            Nurse Assessment:  Last Vital Signs: /83   Pulse 84   Temp 98 °F (36.7 °C) (Oral)   Resp 18   Ht 5' 4\" (1.626 m)   Wt 183 lb 6.8 oz (83.2 kg)   SpO2 97%   BMI 31.48 kg/m²     Last documented pain score (0-10 scale): Pain Level: 0  Last Weight:   Wt Readings from Last 1 Encounters:   22 183 lb 6.8 oz (83.2 kg)     Mental Status:  {IP PT MENTAL STATUS:}    IV Access:  { TIMBO IV ACCESS:703784708}    Nursing Mobility/ADLs:  Walking   {P DME [facility]}  Transfer  {CHP DME KZCT:054064437}  Bathing  {CHP DME HWUJ:765812031}  Dressing  {CHP DME LOGK:651520851}  Toileting  {CHP DME YXWD:410584476}  Feeding  {CHP DME PXP}  Med Admin  {P DME SISS:375984035}  Med Delivery   { TIMBO MED Delivery:838651207}    Wound Care Documentation and Therapy:        Elimination:  Continence: Bowel: {YES / DB:83201}  Bladder: {YES / MU:41477}  Urinary Catheter: {Urinary Catheter:997553187}   Colostomy/Ileostomy/Ileal Conduit: {YES / XW:01272}       Date of Last BM: ***    Intake/Output Summary (Last 24 hours) at 3/30/2022 1120  Last data filed at 3/30/2022 1003  Gross per 24 hour   Intake 910 ml   Output 0 ml   Net 910 ml     I/O last 3 completed shifts: In: 960 [P.O.:930;  I.V.:30]  Out: 300 [Urine:300]    Safety Concerns:     { TIMBO Safety Concerns:698067273}    Impairments/Disabilities:      { TIMBO Impairments/Disabilities:389497916}    Nutrition Therapy:  Current Nutrition Therapy:   [address] TIMBO Diet List:175232904}    Routes of Feeding: {P DME Other Feedings:412163546}  Liquids: {Slp liquid thickness:27210}  Daily Fluid Restriction: {[facility] DME Yes amt example:321880437}  Last Modified Barium Swallow with Video (Video Swallowing Test): {Done Not Done SIK}    Treatments at the Time of Hospital Discharge:   Respiratory Treatments: ***  Oxygen Therapy:  {Therapy; copd oxygen:00927}  Ventilator:    { CC Vent KSUN:271327348}    Rehab Therapies: HOME HEALTH CARE: LEVEL 2 SOCIAL     Home health agency to establish plan of care for patient over 60 day period   Nursing  Initial home SN evaluation visit to occur within 24-48 hours for:  medication management  VS and clinical assessment  S&S chronic disease exacerbation education + when to contact MD / NP  care coordination  Medication Reconciliation during 1st SN visit    PT/OT - therapy goal is to regaining prior level of functioning   Evaluations in home within 24-48 hours of discharge to include DME and home safety   OT to evaluate if patient has [address] needs for personal care      visit within 24-48 hours to evaluate resources & insurance for potential AL, [place], LTC, and Medicaid options  [place] on Aging Referral    Dietician evaluation within five days of discharge     Family/POA Care Conference to discuss home support and care needs to occur within two weeks of discharge  Recommend for home care vitals    Weight Bearing Status/Restrictions: [address] CC Weight Bearin}  Other Medical Equipment (for information only, NOT a DME order):  {EQUIPMENT:375311137}  Other Treatments: ***    Patient's personal belongings (please select all that are sent with patient):  {P DME Belongings:678876544}    RN SIGNATURE:  {Esignature:245843739}    CASE MANAGEMENT/SOCIAL WORK SECTION    Inpatient Status Date: ***    Readmission Risk Assessment Score:  Readmission Risk              Risk of Unplanned Readmission:  18           Discharging to Facility/ Agency   Name:   Address:  Phone:  Fax:    Dialysis Facility (if applicable)   Name:  Address:  Dialysis Schedule:  Phone:  Fax:    / signature: {Esignature:424691898}    PHYSICIAN SECTION    Prognosis: {Prognosis:9637852121}    Condition at Discharge: [address] Patient Condition:528375317}    Rehab Potential (if transferring to Rehab): {Prognosis:6000301342}    Recommended Labs or Other Treatments After Discharge: ***    Physician Certification: I certify the above information and transfer of April Hdz  is necessary for the continuing treatment of the diagnosis listed and that she requires {Admit to Appropriate Level of Care:46272} for {GREATER/LESS:435972501} 30 days.      Update Admission H&P: {CHP DME Changes in ZOGIP:942525265}    PHYSICIAN SIGNATURE:  {Esignature:538102779}

## 2022-03-30 NOTE — PROGRESS NOTES
Patient discharged. IV pulled. Discharge instructions reviewed. All questions answered. Family at bedside. Patient taken down via wheelchair.

## 2022-03-30 NOTE — PROGRESS NOTES
Pulmonology Progress Note  PGY 3  Internal Medicine      Admit Date: 3/28/2022  Diet: ADULT DIET; Regular; 3 carb choices (45 gm/meal); Low Fat/Low Chol/High Fiber/2 gm Na    Chief Complaint: Headache    Interval history:   Patient had a CT chest done that did not show any other concerning nodules nor lymph nodes. From pulmonary standpoint we can continue this work-up as an outpatient. Patient to see Dr. Willard Aden in the office. Medications:     Scheduled Meds:   insulin lispro  0-18 Units SubCUTAneous TID WC    insulin lispro  0-9 Units SubCUTAneous Nightly    insulin lispro  5 Units SubCUTAneous TID WC    amLODIPine  10 mg Oral Daily    aspirin  81 mg Oral Daily    atorvastatin  40 mg Oral Daily    carvedilol  25 mg Oral BID    sodium chloride flush  5-40 mL IntraVENous 2 times per day    heparin (porcine)  5,000 Units SubCUTAneous 3 times per day    insulin glargine  20 Units SubCUTAneous Nightly     Continuous Infusions:   sodium chloride Stopped (03/30/22 1247)    dextrose Stopped (03/30/22 1247)     PRN Meds:bisacodyl, sodium chloride flush, sodium chloride, ondansetron **OR** ondansetron, polyethylene glycol, acetaminophen **OR** acetaminophen, glucose, dextrose bolus (hypoglycemia), glucagon (rDNA), dextrose    Objective:   Vitals:   T-max:  Patient Vitals for the past 8 hrs:   BP Temp Temp src Pulse Resp SpO2   03/30/22 1146 129/79 97.8 °F (36.6 °C) Oral 85 18 99 %   03/30/22 0815 131/83 98 °F (36.7 °C) Oral 84 18 97 %       Intake/Output Summary (Last 24 hours) at 3/30/2022 1334  Last data filed at 3/30/2022 1150  Gross per 24 hour   Intake 700 ml   Output 200 ml   Net 500 ml       Review of Systems   Constitutional: Negative for activity change, appetite change, chills, diaphoresis, fever and unexpected weight change. HENT: Negative for congestion and sore throat. Eyes: Negative for visual disturbance. Respiratory: Negative for apnea, cough, chest tightness and shortness of breath. Cardiovascular: Negative for chest pain, palpitations and leg swelling. Gastrointestinal: Negative for abdominal pain, constipation, diarrhea, nausea and vomiting. Endocrine: Negative for cold intolerance, heat intolerance, polydipsia, polyphagia and polyuria. Genitourinary: Negative for difficulty urinating. Musculoskeletal: Negative for arthralgias and myalgias. Neurological: Negative for weakness and headaches. Psychiatric/Behavioral: Negative for confusion and sleep disturbance. All other systems reviewed and are negative. Physical Exam  Vitals reviewed. Constitutional:       General: She is not in acute distress. Appearance: She is well-developed and well-groomed. HENT:      Head: Normocephalic and atraumatic. Mouth/Throat:      Mouth: Mucous membranes are moist.      Pharynx: Oropharynx is clear. Eyes:      General: No scleral icterus. Extraocular Movements: Extraocular movements intact. Conjunctiva/sclera: Conjunctivae normal.      Pupils: Pupils are equal, round, and reactive to light. Cardiovascular:      Rate and Rhythm: Normal rate and regular rhythm. Pulses: Normal pulses. Heart sounds: Normal heart sounds, S1 normal and S2 normal. No murmur heard. Pulmonary:      Effort: Pulmonary effort is normal. No respiratory distress. Breath sounds: Normal breath sounds and air entry. Abdominal:      General: Abdomen is flat. Bowel sounds are normal.      Palpations: Abdomen is soft. Tenderness: There is no abdominal tenderness. Musculoskeletal:         General: Normal range of motion. Cervical back: Full passive range of motion without pain, normal range of motion and neck supple. Skin:     General: Skin is warm and dry. Neurological:      General: No focal deficit present. Mental Status: She is alert and oriented to person, place, and time. Cranial Nerves: Cranial nerves are intact. Sensory: Sensation is intact. nodule in the right lower lobe. This could be inflammatory or neoplastic. Recommend further evaluation with PET/CT. 2.  No additional abnormalities. CT ABDOMEN PELVIS WO CONTRAST Additional Contrast? Oral   Final Result   1. There is an 11 mm subpleural pulmonary nodule identified posteriorly with the right lower lobe. This may represent pulmonary neoplasm, or granuloma. Recommend further evaluation with PET CT scan. 2. Coronary artery calcification. 3. Uncomplicated descending colon and sigmoid colon diverticulosis. 4. Small fat-containing umbilical hernia. 5. There is some mild groundglass attenuation identified within the abdominal mesentery which can be seen with a sclerosing mesenteritis. CT HEAD WO CONTRAST   Final Result      No acute intracranial abnormality. No significant change from prior study. XR CHEST (2 VW)   Final Result   1. No evidence of acute cardiopulmonary disease. Assessment/Plan:   Clinton Carvajal is a 72F with a newly found pulmonary nodule.     Pulmonary Nodule. CT of abdomen showed a 11 mm subpleural pulmonary nodule identified posteriorly with the right lower lobe. This may represent pulmonary neoplasm, or granuloma. She has a significant smoking history and has never had prior imagining of her chest. No cough or other lung issues in the past.  ? CT chest on 3/29/2022 did not show any other concerning abnormalities and confirmed the nodule. · Patient to follow-up with as an outpatient with Dr. Monica Reyes to continue the  work-up. · Prior to her appointment she will have a PET/CT done.        This patient has been staffed and discussed with Dr. Moises Neumann MD, PGY-3  3/30/2022  1:34 PM    Pulm     Patient seen and examined.  I agree with Dr. De La Cruz's history, physical, lab findings, assessment and plan.     I was consulted yesterday for incidental 11 mm right lower lobe subpleural pulmonary nodule found on CT of the abdomen performed for abdominal distention.     Erick Mars has no history of any chronic lung disease nor has she ever seen a pulmonologist.  She previously smoked but she was a little bit elusive as to amount. She said she is smoked on and off since age of 23 approximately 1 to 2 packs/week. She states she did not smoke more because cigarettes were too expensive.     She has been a lifelong resident of [place]. Her dad had lung cancer and asbestosis.     She has no pulmonary complaints     CT chest was obtained yesterday to assess for other significant pulmonary nodules or masses as well as mediastinal hilar lymphadenopathy, which there was not. Plan is to discharge patient today and obtain an outpatient PET CT next available and then see me in the office after that hopefully within 2 weeks.     Patient is aware as is my office     Eunice Goodwin MD

## 2022-03-30 NOTE — CARE COORDINATION
Case Management Assessment            Discharge Note                    Date / Time of Note: 3/30/2022 11:31 AM                  Discharge Note Completed by: Yamilex Murray RN    Patient Name: Vitaliy Frankel   YOB: 1945  Diagnosis: Hyponatremia [E87.1]  Coarse tremors [G25.2]  Hyperglycemia [R73.9]   Date / Time: 3/28/2022 10:49 AM    Current PCP: Lila Rico MD  Clinic patient: Yes    Hospitalization in the last 30 days: No    Advance Directives:  Code Status: [address] DNR form completed and on chart: Not Indicated    Financial:  Payor: MEDICARE / Plan: MEDICARE PART A AND B / Product Type: *No Product type* /      Pharmacy:    [place], [facility] [address] Usha Ayersnadiya [phone] - F [phone]  [address] [facility] [address]  Phone: [phone] Fax: [phone]      Assistance purchasing medications?: Potential Assistance Purchasing Medications: No  Assistance provided by Case Management: None at this time    Does patient want to participate in local refill/ meds to beds program?:      Meds To Beds General Rules:  1. Can ONLY be done Monday- Friday between 8:30am-5pm  2. Prescription(s) must be in pharmacy by 3pm to be filled same day  3. Copy of patient's insurance/ prescription drug card and patient face sheet must be sent along with the prescription(s)  4. Cost of Rx cannot be added to hospital bill. If financial assistance is needed, please contact unit  or ;  or  CANNOT provide pharmacy voucher for patients co-pays  5.  Patients can then  the prescription on their way out of the hospital at discharge, or pharmacy can deliver to the bedside if staff is available. (payment due at time of pick-up or delivery - cash, check, or card accepted)     Able to afford home medications/ co-pay costs: Yes    ADLS:  Current PT AM-PAC Score: 18 /24  Current OT AM-PAC Score: 18 /24      DISCHARGE

## 2022-03-30 NOTE — DISCHARGE SUMMARY
INTERNAL MEDICINE DEPARTMENT AT [address] [place] [address]  DISCHARGE SUMMARY    Patient ID: Payal Diehl                                             Discharge Date: 3/30/2022   Patient's PCP: Juan F Smith MD                                          Discharge Physician: Jc Fink MD  Admit Date: 3/28/2022   Admitting Physician: No admitting provider for patient encounter. PROBLEMS DURING HOSPITALIZATION:  Present on Admission:   Hyperglycemia    DISCHARGE DIAGNOSES:  1. Hyperglycemia 2/2 Dietary Non-Compliance in Setting of T2DM  2. Lung nodule, RLL     HPI:    Payal Diehl is a 75yoF with a PMHx paroxysmal Afib, CKD stage 3, DM2, HTN, HLD, HFpEF who presented with headache, shaking, increased thirst and urination in the setting of high sugar readings at home. The following issues were addressed during hospitalization:    Hyperglycemia   Patient glucose was 717 on admission. UA showed glucose >1000, trace ketones and protein. Last hemoglobin a1c Feb 2022 was 14.   - She was given Insulin and Lispro to control her sugars initially. [facility] regimen included Lantus 20U nightly, Lispro 5U TID and HDSSI.   - Glucose levels were monitored frequently, trended down from 700's to 100's over the course of her admission.   - She was educated regarding diet compliance and medication compliance, encouraged to follow with outpatient clinic and appointment scheduled.     Lung Nodule   CTAP during admission shows 11 mm x 7 mm noncalcified pulmonary nodule identified posteriorly within the right lower lobe. Family history of cancers, pts father passed from lung ca. - Pulm was consulted, got CT chest in house which further characterized lung nodule. They recommended PET scan outpatient. - Pulmonology recommended outpatient follow-up which was arranged for patient on discharge.      Chronic conditions were managed with home medication.  She was counseled extensively on discharge instructions, including medication and dietary compliance as well as follow-up appointments. Physical Exam:  /79   Pulse 85   Temp 97.8 °F (36.6 °C) (Oral)   Resp 18   Ht 5' 4\" (1.626 m)   Wt 183 lb 6.8 oz (83.2 kg)   SpO2 99%   BMI 31.48 kg/m²      Physical Exam  Constitutional:       Appearance: Normal appearance. HENT:      Head: Normocephalic and atraumatic. Nose: Nose normal.      Mouth/Throat:      Mouth: Mucous membranes are moist.      Pharynx: Oropharynx is clear. Eyes:      Comments: R eye drooped. Cardiovascular:      Rate and Rhythm: Normal rate and regular rhythm. Pulses: Normal pulses. Heart sounds: Normal heart sounds. Pulmonary:      Effort: Pulmonary effort is normal. No respiratory distress. Breath sounds: No wheezing, rhonchi or rales. Comments: LUZ MARIA with reduced breath sounds. Abdominal:      General: Abdomen is flat. Bowel sounds are normal.      Palpations: Abdomen is soft. Tenderness: There is no abdominal tenderness. Musculoskeletal:         General: Normal range of motion. Cervical back: Normal range of motion and neck supple. Skin:     General: Skin is warm and dry. Capillary Refill: Capillary refill takes less than 2 seconds. Neurological:      General: No focal deficit present. Mental Status: She is alert and oriented to person, place, and time. Mental status is at baseline.      Consults: Pulmonology  Significant Diagnostic Studies: CXR, CT Chest  Treatments: Anti-hyperglycemic agents, IVF  Disposition: Home   Discharged Condition: Stable  Follow Up: Primary Care Physician in 1 Week    DISCHARGE MEDICATION:     Medication List      CONTINUE taking these medications    amLODIPine 10 MG tablet  Commonly known as: NORVASC  TAKE 1 TABLET BY MOUTH ONE TIME A DAY     aspirin 81 MG chewable tablet  Commonly known as: Aspirin Childrens  Take 1 tablet by mouth daily     atorvastatin 40 MG tablet  Commonly known as: Lipitor  Take 1 tablet by mouth daily Basaglsarah SagastumeikPen 100 UNIT/ML injection pen  Generic drug: insulin glargine  Inject 20 Units into the skin nightly     blood glucose monitor kit and supplies  Dispense sufficient amount for indicated testing frequency plus additional to accommodate PRN testing needs. Dispense all needed supplies to include: monitor, strips, lancing device, lancets, control solutions, alcohol swabs. Calcium-Vitamin D 600-200 MG-UNIT Tabs  Take 1 tablet by mouth 2 times daily     carvedilol 25 MG tablet  Commonly known as: COREG  take 1 tablet by mouth 2 times a day with meals     dapagliflozin 10 MG tablet  Commonly known as: Farxiga  Take 1 tablet by mouth every morning     ezetimibe 10 MG tablet  Commonly known as: ZETIA  TAKE 1 TABLET BY MOUTH ONE TIME A DAY     furosemide 40 MG tablet  Commonly known as: LASIX  TAKE 1 TABLET BY MOUTH ONE TIME A DAY     glimepiride 4 MG tablet  Commonly known as: AMARYL  Take 1 tablet by mouth every morning (before breakfast)     Handicap Placard Misc  by Does not apply route Start date: 10/27/21  End date 10/26/26     lisinopril 40 MG tablet  Commonly known as: PRINIVIL;ZESTRIL  TAKE ONE TABLET BY MOUTH ONE TIME DAILY     Trulicity 1.5 EP/2.2DH Sopn  Generic drug: Dulaglutide  Inject 1.5 mg into the skin once a week     vitamin D3 25 MCG (1000 UT) Tabs tablet  Commonly known as: CHOLECALCIFEROL  Take 1 tablet by mouth daily          Activity: As tolerated. Diet: Regular diet. Wound Care: None. Time Spent on discharge is more than 30 minutes.     Signed:  Ramond Essex, MD, PGY-1  Internal Medicine Resident  3/30/2022

## 2022-03-30 NOTE — PROGRESS NOTES
Progress Note    Admit Date: 3/28/2022  Day: 3  Diet: ADULT DIET; Regular; 3 carb choices (45 gm/meal); Low Fat/Low Chol/High Fiber/2 gm Na    CC: Headache, tremors. Interval history:   Patient seen at bedside, no acute events overnight. Sugars continue to be fairly well controlled. Patient continues to endorse she feels improved, no tremors. She endorses good appetite and is eating her breakfast at time of my interview. On ROS, she denies fever/chills, nausea/vomiting, abdominal pain, acute urinary abnormalities. She endorsed constipation overnight for which she received a laxative. HPI:     Donavan Calderon is a 75yoF with a PMHx paroxysmal Afib, CKD stage 3, DM2, HTN, HLD, HFpEF who presented with HA. Patient reports the headache as right sided, dull and achy. Around the same time she has been experiencing \"shaking\", increased thirst and urination. Patient reports her glucometer has read her sugar as \"high\" over the past few days. She states she is compliance with her medications. She is on trulicity, farxiga and takes glargine 20 units nightly at home. Patient's hemoglobin a1c has been 14.0 on the last few checks.      Patient reports a non-productive cough for the past few days but denies any f/c, SOB, CP, palpitations, abdominal pain, N/V or urinary symptoms. Denies any weight loss but appears to have lost close to 30 lb in last year. Hx of smoking a pack a week for a few years but quit about 6 years ago. Drinks socially. Denies recreational drug use.  Lives at home is is independent.        Medications:     Scheduled Meds:   insulin lispro  0-18 Units SubCUTAneous TID WC    insulin lispro  0-9 Units SubCUTAneous Nightly    insulin lispro  5 Units SubCUTAneous TID WC    amLODIPine  10 mg Oral Daily    aspirin  81 mg Oral Daily    atorvastatin  40 mg Oral Daily    carvedilol  25 mg Oral BID    sodium chloride flush  5-40 mL IntraVENous 2 times per day    heparin (porcine)  5,000 Units SubCUTAneous 3 times per day    insulin glargine  20 Units SubCUTAneous Nightly     Continuous Infusions:   sodium chloride      dextrose       PRN Meds:bisacodyl, sodium chloride flush, sodium chloride, ondansetron **OR** ondansetron, polyethylene glycol, acetaminophen **OR** acetaminophen, glucose, dextrose bolus (hypoglycemia), glucagon (rDNA), dextrose    Objective:   Vitals:   T-max:  Patient Vitals for the past 8 hrs:   BP Temp Temp src Pulse Resp SpO2 Weight   03/30/22 0815 131/83 98 °F (36.7 °C) Oral 84 18 97 % --   03/30/22 0527 -- -- -- -- -- -- 183 lb 6.8 oz (83.2 kg)   03/30/22 0426 (!) 102/56 97.9 °F (36.6 °C) Oral 85 16 97 % --       Intake/Output Summary (Last 24 hours) at 3/30/2022 0825  Last data filed at 3/30/2022 0610  Gross per 24 hour   Intake 550 ml   Output 0 ml   Net 550 ml     Review of Systems   Per Interval hx. Physical Exam  Constitutional:       Appearance: Normal appearance. HENT:      Head: Normocephalic and atraumatic. Nose: Nose normal.      Mouth/Throat:      Mouth: Mucous membranes are moist.      Pharynx: Oropharynx is clear. Eyes:      Comments: R eye drooped. Poor vision. Cardiovascular:      Rate and Rhythm: Normal rate and regular rhythm. Pulses: Normal pulses. Heart sounds: Normal heart sounds. Pulmonary:      Effort: Pulmonary effort is normal. No respiratory distress. Breath sounds: No wheezing, rhonchi or rales. Comments: LUZ MARIA with reduced breath sounds. Abdominal:      General: Abdomen is flat. Bowel sounds are normal.      Palpations: Abdomen is soft. Tenderness: There is no abdominal tenderness. Musculoskeletal:         General: Normal range of motion. Cervical back: Normal range of motion and neck supple. Skin:     General: Skin is warm and dry. Capillary Refill: Capillary refill takes less than 2 seconds. Neurological:      General: No focal deficit present.       Mental Status: She is alert and oriented to person, place, and time. Mental status is at baseline. LABS:    CBC:   Recent Labs     03/28/22  1149 03/29/22  0537 03/30/22  0453   WBC 5.5 5.8 5.2   HGB 10.5* 9.0* 9.0*   HCT 32.0* 26.4* 26.5*    215 213   MCV 83.5 80.7 81.5     Renal:    Recent Labs     03/28/22  1149 03/28/22  1600 03/29/22  0537 03/30/22  0453   *  --  136 135*   K 4.6  --  4.0 4.0   CL 88*  --  104 107   CO2 21  --  22 21   BUN 37*  --  30* 31*   CREATININE 1.9*  --  1.5* 1.8*   GLUCOSE 717*  --  124* 172*   CALCIUM 9.5  --  9.0 8.6   MG  --  2.10 2.00 2.20   PHOS  --   --  2.7 4.0   ANIONGAP 13  --  10 7     Hepatic:   Recent Labs     03/28/22  1149 03/29/22  0537 03/30/22 0453   AST 7*  --   --    ALT <5*  --   --    BILITOT 0.3  --   --    BILIDIR <0.2  --   --    PROT 7.7  --   --    LABALBU 3.8 3.2* 3.0*   ALKPHOS 125  --   --      Troponin:   Recent Labs     03/28/22  1149   TROPONINI <0.01     BNP: No results for input(s): BNP in the last 72 hours. Lipids: No results for input(s): CHOL, HDL in the last 72 hours. Invalid input(s): LDLCALCU, TRIGLYCERIDE  ABGs:  No results for input(s): PHART, AFR7OZE, PO2ART, XYV4VCL, BEART, THGBART, R4JJWLML, XCT2RLF in the last 72 hours. INR: No results for input(s): INR in the last 72 hours. Lactate: No results for input(s): LACTATE in the last 72 hours. Cultures:  -----------------------------------------------------------------  RAD:   CT CHEST WO CONTRAST   Final Result      1. Stable 9 x 8 mm subpleural nodule in the right lower lobe. This could be inflammatory or neoplastic. Recommend further evaluation with PET/CT. 2.  No additional abnormalities. CT ABDOMEN PELVIS WO CONTRAST Additional Contrast? Oral   Final Result   1. There is an 11 mm subpleural pulmonary nodule identified posteriorly with the right lower lobe. This may represent pulmonary neoplasm, or granuloma. Recommend further evaluation with PET CT scan. 2. Coronary artery calcification.    3. Uncomplicated descending colon and sigmoid colon diverticulosis. 4. Small fat-containing umbilical hernia. 5. There is some mild groundglass attenuation identified within the abdominal mesentery which can be seen with a sclerosing mesenteritis. CT HEAD WO CONTRAST   Final Result      No acute intracranial abnormality. No significant change from prior study. XR CHEST (2 VW)   Final Result   1. No evidence of acute cardiopulmonary disease. Assessment/Plan:   Stepan Taveras is a 75yoF with a PMHx paroxysmal Afib, CKD stage 3, DM2, HTN, HLD, HFpEF who presented with HA, shaking, increased thirst and urination in the setting of high sugar readings at home. Hyperglycemia   Patient glucose was 717 on admission. UA showed glucose >1000, trace ketones and protein. Last hemoglobin a1c was 14.   - Lantus 20U nightly  - Lispro 5U TID   - HDSSI   - continue Glucose checks q4h  - Endocrine c/s as outpatient      Lung Nodule   CTAP shows 11 mm x 7 mm noncalcified pulmonary nodule identified posteriorly within the right lower lobe. Family history of cancers, pts father passed from lung ca.    - Pulm c/s - appreciate reccs; CT done inpt and patient will f/u for Pulm visit and possible PETCT outpatient     CKD 3  Cr fluctuates and was 2.2 on 10/2021.   - daily BMP     Chronic Medical Conditions:  Afib -  Coreg 25 BID   HTN - continue Amlodipine 10 mg daily  HLD - continue Atorvastatin      Code Status: FULL CODE  FEN: Adult Diet; Carb Control  PPX: SQH  DISPO: Kiran Blair MD, PGY-1  03/30/22  8:25 AM    This patient has been staffed and discussed with Shakeel Ludwig MD.

## 2022-03-30 NOTE — PLAN OF CARE
Problem: ACTIVITY INTOLERANCE/IMPAIRED MOBILITY  Goal: Mobility/activity is maintained at optimum level for patient  Outcome: Ongoing     Problem: Falls - Risk of:  Goal: Will remain free from falls  Description: Will remain free from falls  3/29/2022 2317 by Nicole Garnica RN  Outcome: Ongoing  3/29/2022 1945 by Guille Hdez RN  Outcome: Ongoing     Problem: Skin Integrity:  Goal: Absence of new skin breakdown  Description: Absence of new skin breakdown  3/29/2022 2317 by Nicole Garnica RN  Outcome: Ongoing  3/29/2022 1945 by Guille Hdez RN  Outcome: Ongoing

## 2022-03-30 NOTE — CARE COORDINATION
[facility]     Patient aware and agreeable to services.  Faxed orders to [facility] for [facility] by 4/1    Filiberto Castellon LPN  Care Transition Nurse  [address]  [phone]

## 2022-03-31 ENCOUNTER — TELEPHONE (OUTPATIENT)
Dept: PULMONOLOGY | Age: 77
End: 2022-03-31

## 2022-03-31 ENCOUNTER — CARE COORDINATION (OUTPATIENT)
Dept: CASE MANAGEMENT | Age: 77
End: 2022-03-31

## 2022-03-31 ENCOUNTER — TELEPHONE (OUTPATIENT)
Dept: INTERNAL MEDICINE CLINIC | Age: 77
End: 2022-03-31

## 2022-03-31 RX ORDER — PEN NEEDLE, DIABETIC 31 G X1/4"
NEEDLE, DISPOSABLE MISCELLANEOUS
Qty: 100 EACH | Refills: 3 | Status: SHIPPED | OUTPATIENT
Start: 2022-03-31

## 2022-03-31 NOTE — TELEPHONE ENCOUNTER
[facility] HOME HEALTH NURSE STATED PT DID NOT GET  RX FOR QUICK PEN FOR HER BS. MEAGHAN ALSO STATED PT'S BS  WHEN SHE TOOK HER BS.  [address] [facility] [phone]

## 2022-03-31 NOTE — CARE COORDINATION
[facility] Transitions Initial Follow Up Call    Call within 2 business days of discharge: Yes    Patient: Kimberli Darby Patient : 1945   MRN: [id number]  Reason for Admission: Hyperglycemia  Discharge Date: 3/30/22 RARS: Readmission Risk Score: 17.2 ( )      Last Discharge [facility]       Complaint Diagnosis Description Type Department Provider    3/28/22 Hyperglycemia; Headache; Tremors Hyperglycemia . .. ED to Hosp-Admission (Discharged) (ADMITTED) [facility] 4 U Jannette Ballesteros MD; Clarice Eli. .. Spoke with: Kimberli Darby who states that she just woke up and to give a call back so she can get herself together. Facility: [facility], [facility].    Writer contacted [facility] and spoke with Meet [place] who verified that they have patient's referral information.      Care Transitions 24 Hour Call    Care Transitions Interventions         Follow Up  Future Appointments   Date Time Provider Janet Pollard   2022  2:15 PM Hien Bose MD [facility]   2022  2:15 PM RESIDENT CLINIC [facility]       Sal Burton LPN

## 2022-03-31 NOTE — TELEPHONE ENCOUNTER
April Jamie Rastafarian daughter called to schedule office visit and PET scan. I have faxed order to [facility] to call April to schedule testing. I have asked April to call back after testing has been scheduled so we can work around the schedule for pt to come to office for results. She verbalized understanding.    Orders faxed to [facility]

## 2022-04-01 ENCOUNTER — CARE COORDINATION (OUTPATIENT)
Dept: CASE MANAGEMENT | Age: 77
End: 2022-04-01

## 2022-04-01 NOTE — CARE COORDINATION
Trav 45 Transitions Initial Follow Up Call    Call within 2 business days of discharge: Yes    Patient: Jakub Deluna Patient : 1945   MRN: [id number]  Reason for Admission: Hyperglycemia  Discharge Date: 3/30/22 RARS: Readmission Risk Score: 17.2 ( )      Last Discharge [facility]       Complaint Diagnosis Description Type Department Provider    3/28/22 Hyperglycemia; Headache; Tremors Hyperglycemia . .. ED to Hosp-Admission (Discharged) (ADMITTED) TJ 4 PCU Jannette Vargas MD; Senthil Peacock. .. Spoke with: Jakub Deluna who report that she is ok but have questions on what to eat. Patient also asked what do she do when BS is elevated or too low. Writer educated patient on s/s of hyper and hypoglycemia. Patient verbalized understanding. Writer advised what/how to correct blood sugar levels. Patient will be starting Diabetic on 2022. Patient denies cp, sob, cough, dizziness, headache, n/v, diarrhea, abdominal pains, fever, or chills. Patient report that appetite and fluid intake is good and denies any problems with bowel or bladder. Patient is taking all medications as ordered. Writer and patient did a complete medication review and 1111f was completed. Denies any needs at this time. Patient instructed to continue to monitor s/s, reporting any that may present to MD immediately for early intervention. Reminded of COVID 19 precautions. Agreeable to f/u calls. Facility: [facility]    Transitions of Care Initial Call    Was this an external facility discharge? No Discharge     Challenges to be reviewed by the provider   Additional needs identified to be addressed with provider: No  none             Method of communication with provider : none      Advance Care Planning:   Does patient have an Advance Directive: not on file; education provided. Was this a readmission?  No  Patient stated reason for admission: elevated BS  Patients top risk factors for readmission: medical condition-DM2    Care Transition Nurse (CTN) contacted the patient by telephone to perform post hospital discharge assessment. Verified name and  with patient as identifiers. Provided introduction to self, and explanation of the CTN role. CTN reviewed discharge instructions, medical action plan and red flags with patient who verbalized understanding. Patient given an opportunity to ask questions and does not have any further questions or concerns at this time. Were discharge instructions available to patient? Yes. Reviewed appropriate site of care based on symptoms and resources available to patient including: PCP  Specialist  Home health  When to call 911. The patient agrees to contact the PCP office for questions related to their healthcare. Medication reconciliation was performed with patient, who verbalizes understanding of administration of home medications. Advised obtaining a 90-day supply of all daily and as-needed medications. Covid Risk Education     Educated patient about risk for severe COVID-19 due to risk factors according to CDC guidelines. LPN CC reviewed discharge instructions, medical action plan and red flag symptoms with the patient who verbalized understanding. Discussed COVID vaccination status: Yes. Education provided on COVID-19 vaccination as appropriate. Discussed exposure protocols and quarantine with CDC Guidelines. Patient was given an opportunity to verbalize any questions and concerns and agrees to contact LPN CC or health care provider for questions related to their healthcare. Reviewed and educated patient on any new and changed medications related to discharge diagnosis. Was patient discharged with a pulse oximeter? No Discussed and confirmed pulse oximeter discharge instructions and when to notify provider or seek emergency care. LPN CC provided contact information.  Plan for follow-up call in 5-7 days based on severity of symptoms and risk factors.   Plan for next call: symptom management-.       Care Transitions 24 Hour Call    Do you have any ongoing symptoms?: No  Were you discharged with any Home Care or Post Acute Services: No  Care Transitions Interventions   Home Care Waiver: Completed            Follow Up  Future Appointments   Date Time Provider Janet Pollard   4/6/2022  2:15 PM Flako Mauricio MD [facility]   5/18/2022  2:15 PM RESIDENT CLINIC [facility]       Demetrio Clark LPN

## 2022-04-04 ENCOUNTER — TELEPHONE (OUTPATIENT)
Dept: PULMONOLOGY | Age: 77
End: 2022-04-04

## 2022-04-04 NOTE — TELEPHONE ENCOUNTER
F Y I     [facility] PET scan spoke to pt's daughter to schedule appt but was told that the pt's blood sugar is high & then low. It has been all over the place. Daughter was going to monitor it this weekend to see if it would stay consistent so she can schedule PET scan. As of today this has NOT been scheduled.

## 2022-04-06 ENCOUNTER — OFFICE VISIT (OUTPATIENT)
Dept: INTERNAL MEDICINE CLINIC | Age: 77
End: 2022-04-06
Payer: MEDICARE

## 2022-04-06 VITALS
SYSTOLIC BLOOD PRESSURE: 97 MMHG | DIASTOLIC BLOOD PRESSURE: 63 MMHG | TEMPERATURE: 96.3 F | WEIGHT: 180.5 LBS | OXYGEN SATURATION: 99 % | HEART RATE: 86 BPM | RESPIRATION RATE: 20 BRPM | HEIGHT: 64 IN | BODY MASS INDEX: 30.81 KG/M2

## 2022-04-06 DIAGNOSIS — E11.65 UNCONTROLLED TYPE 2 DIABETES MELLITUS WITH HYPERGLYCEMIA (HCC): Primary | ICD-10-CM

## 2022-04-06 DIAGNOSIS — K59.00 CONSTIPATION, UNSPECIFIED CONSTIPATION TYPE: ICD-10-CM

## 2022-04-06 DIAGNOSIS — E78.2 MIXED HYPERLIPIDEMIA: Chronic | ICD-10-CM

## 2022-04-06 DIAGNOSIS — I10 ESSENTIAL HYPERTENSION: ICD-10-CM

## 2022-04-06 DIAGNOSIS — R91.1 PULMONARY NODULE: Primary | ICD-10-CM

## 2022-04-06 PROCEDURE — 99213 OFFICE O/P EST LOW 20 MIN: CPT | Performed by: STUDENT IN AN ORGANIZED HEALTH CARE EDUCATION/TRAINING PROGRAM

## 2022-04-06 RX ORDER — ATORVASTATIN CALCIUM 40 MG/1
40 TABLET, FILM COATED ORAL DAILY
Qty: 30 TABLET | Refills: 3 | Status: SHIPPED | OUTPATIENT
Start: 2022-04-06 | End: 2022-10-05

## 2022-04-06 RX ORDER — POLYETHYLENE GLYCOL 3350 17 G/17G
17 POWDER, FOR SOLUTION ORAL 2 TIMES DAILY PRN
Qty: 1 EACH | Refills: 3 | Status: SHIPPED | OUTPATIENT
Start: 2022-04-06 | End: 2022-10-03

## 2022-04-06 RX ORDER — INSULIN GLARGINE 100 [IU]/ML
25 INJECTION, SOLUTION SUBCUTANEOUS NIGHTLY
Qty: 5 PEN | Refills: 0 | Status: SHIPPED | OUTPATIENT
Start: 2022-04-06 | End: 2022-05-02

## 2022-04-06 RX ORDER — AMLODIPINE BESYLATE 5 MG/1
TABLET ORAL
Qty: 90 TABLET | Refills: 2 | Status: SHIPPED | OUTPATIENT
Start: 2022-04-06 | End: 2022-04-06 | Stop reason: ALTCHOICE

## 2022-04-06 ASSESSMENT — ENCOUNTER SYMPTOMS
ABDOMINAL PAIN: 0
VOMITING: 0
CONSTIPATION: 1
NAUSEA: 0
CHEST TIGHTNESS: 0
WHEEZING: 0
SHORTNESS OF BREATH: 0
COUGH: 0

## 2022-04-06 NOTE — PROGRESS NOTES
Outpatient Clinic Established Patient Note    Patient: Carmen Duncan  : 1945 (64 y.o.)  Date: 2022    CC: Hospital follow up    HPI:      D/c from hospital on 22 after being admitted for hyperglycemia  Lab Results   Component Value Date    LABA1C >18.0 2022     D/c on 20u basaglar, Farxiga 10mg, glimepiride 4mg, Trulicity 2.4GF qweekly    -160. 100 was the lowest number she got post discharge, asx    Diet:   Tuna salad, meat loaf, fish, cabbage, 1 piece of chicken    Potatoes: once every 2 weeks  Pop: no pop  Fruit juice: zero sugar gatorade  Pasta: none  Pastry: none  Per granddaughter, pt is not very adherent to diabetic diet  Exercise: no regimented exercise  Weight: lost 30lbs from poor PO intake. Is eating better now. Last eye exam: been a while. Recommended getting appt  Last diabetic foot exam: never    No numbness/tingling hands and feet  Some polyuria, no polydipsia currently     Lives with grandson able to do ADL independently     Still refusing vaccines, colonoscopy, mammogram    Previous smoker, for ~ 40 years, 0.25 ppd  Repeat CT chest in the 2022 and then follow with Dr Isabel Vargas    Pt does have hypotensive episodes with SBP high 90s  Pt is asx    Home Meds:  Prior to Visit Medications    Medication Sig Taking?  Authorizing Provider   atorvastatin (LIPITOR) 40 MG tablet Take 1 tablet by mouth daily Yes Burgess Srinivasan MD   polyethylene glycol [facility]) 17 GM/SCOOP powder Take 17 g by mouth 2 times daily as needed (constipation) Yes Burgess Srinivasan MD   insulin glargine (BASAGLAR KWIKPEN) 100 UNIT/ML injection pen Inject 25 Units into the skin nightly Yes Burgess Srinivasan MD   Insulin Pen Needle (PEN NEEDLES) 31G X 6 MM MISC Use with basaglar nightly Yes Amparo Ortiz MD   ezetimibe (ZETIA) 10 MG tablet TAKE 1 TABLET BY MOUTH ONE TIME A DAY Yes Jean Paul Pollack MD   Dulaglutide (TRULICITY) 1.5 NN/4.3FH SOPN Inject 1.5 mg into the skin once a week Yes Laura Barrientos MD   blood glucose monitor kit and supplies Dispense sufficient amount for indicated testing frequency plus additional to accommodate PRN testing needs. Dispense all needed supplies to include: monitor, strips, lancing device, lancets, control solutions, alcohol swabs. Yes Laura Barrientos MD   carvedilol (COREG) 25 MG tablet take 1 tablet by mouth 2 times a day with meals Yes Kathyanne Collet, MD   lisinopril (PRINIVIL;ZESTRIL) 40 MG tablet TAKE ONE TABLET BY MOUTH ONE TIME DAILY Yes Kathyanne Collet, MD   dapagliflozin (FARXIGA) 10 MG tablet Take 1 tablet by mouth every morning Yes Kathyanne Collet, MD   glimepiride (AMARYL) 4 MG tablet Take 1 tablet by mouth every morning (before breakfast) Yes Laura Barrientos MD   Ramiro Ellis [address] [facility] by Does not apply route Start date: 10/27/21  End date 10/26/26 Yes Laura Barrientos MD   furosemide (LASIX) 40 MG tablet TAKE 1 TABLET BY MOUTH ONE TIME A DAY Yes Eugene Pérez MD   Calcium-Vitamin D 600-200 MG-UNIT TABS Take 1 tablet by mouth 2 times daily Yes Landry Heck MD   aspirin (ASPIRIN CHILDRENS) 81 MG chewable tablet Take 1 tablet by mouth daily Yes Landry Heck MD   vitamin D3 (CHOLECALCIFEROL) 25 MCG (1000 UT) TABS tablet Take 1 tablet by mouth daily Yes Landry Heck MD     Allergies:    Patient has no known allergies. Health Maintenance Due   Topic Date Due    Hepatitis C screen  Never done    Depression Monitoring  Never done    DTaP/Tdap/Td vaccine (1 - Tdap) Never done    Shingles Vaccine (1 of 2) Never done    DEXA (modify frequency per FRAX score)  Never done    Pneumococcal 65+ years Vaccine (1 of 1 - PPSV23) Never done    Annual Wellness Visit (AWV)  Never done       There is no immunization history on file for this patient. Review of Systems   Constitutional: Negative for activity change, appetite change, chills, diaphoresis, fatigue and fever. Respiratory: Negative for cough, chest tightness, shortness of breath and wheezing. Cardiovascular: Negative for chest pain, palpitations and leg swelling. Gastrointestinal: Positive for constipation. Negative for abdominal pain, nausea and vomiting. Neurological: Negative for syncope, weakness, light-headedness, numbness and headaches. A 10-organ Review Of Systems was obtained and otherwise unremarkable except as per HPI. Data: Old records have been reviewed electronically. PHYSICAL EXAM:  BP 97/63 (Site: Left Upper Arm, Position: Standing, Cuff Size: Large Adult)   Pulse 86   Temp 96.3 °F (35.7 °C) (Temporal)   Resp 20   Ht 5' 4\" (1.626 m)   Wt 180 lb 8 oz (81.9 kg)   SpO2 99%   BMI 30.98 kg/m²   Physical Exam  Constitutional:       General: She is not in acute distress. Appearance: Normal appearance. She is normal weight. She is not ill-appearing, toxic-appearing or diaphoretic. HENT:      Mouth/Throat:      Mouth: Mucous membranes are moist.   Eyes:      Pupils: Pupils are equal, round, and reactive to light. Cardiovascular:      Rate and Rhythm: Normal rate and regular rhythm. Pulses: Normal pulses. Heart sounds: Normal heart sounds. No murmur heard. Pulmonary:      Effort: Pulmonary effort is normal. No respiratory distress. Breath sounds: Normal breath sounds. No wheezing or rales. Abdominal:      General: Bowel sounds are normal. There is no distension. Palpations: Abdomen is soft. Tenderness: There is no abdominal tenderness. There is no guarding. Musculoskeletal:         General: No swelling. Right lower leg: No edema. Left lower leg: No edema. Neurological:      Mental Status: She is alert and oriented to person, place, and time.        The 10-year ASCVD risk score (Letitia Henson., et al., 2013) is: 34.3%    Values used to calculate the score:      Age: 64 years      Sex: Female      Is Non- : Yes      Diabetic: Yes      Tobacco smoker: No      Systolic Blood Pressure: 97 mmHg      Is BP treated: Yes      HDL Cholesterol: 68 mg/dL      Total Cholesterol: 230 mg/dL    Assessment & Plan:      1. Uncontrolled type 2 diabetes mellitus with hyperglycemia ([address]  Lab Results   Component Value Date    LABA1C >18.0 03/28/2022      FBG in the 150s-160s  Will increase lantus to 25 units   Continue rest of the meds as is  Counseled on diet and exercise     - insulin glargine (BASAGLAR KWIKPEN) 100 UNIT/ML injection pen; Inject 25 Units into the skin nightly  Dispense: 5 pen; Refill: 0    2. Primary hypertension  Hypotensive in clinic  Will stop amlodipine. RTC in few weeks for HTN check     3. Pure hypercholesterolemia  - atorvastatin (LIPITOR) 40 MG tablet; Take 1 tablet by mouth daily  Dispense: 30 tablet; Refill: 3    4. Constipation, unspecified constipation type  - polyethylene glycol (GLYCOLAX) 17 GM/SCOOP powder; Take 17 g by mouth 2 times daily as needed (constipation)  Dispense: 1 each; Refill: 3    Return in about 2 weeks (around 4/20/2022), or if symptoms worsen or fail to improve. Dispo: Pt has been staffed with Dr. Tian Newell  _______________  Bhavna Fall MD, 4/6/2022 3:50 PM   PGY-2    Addendum to Resident H& P/Progress note:  I have personally seen,examined and evaluated the patient.  I have reviewed the current history, physical findings, labs and assessment and plan and agree with note as documented by resident MD ( Linsey Brumfield)      Roseanne Marquez MD, [address]

## 2022-04-06 NOTE — TELEPHONE ENCOUNTER
Thanks for update on Daxa's PET scan. If ultimately PET scan is unable to be performed due to poor control of diabetes then the next best option would be repeat CT chest beginning of July 2022 and then follow-up with me in the office.   I will place an order for that CT chest now

## 2022-04-06 NOTE — PATIENT INSTRUCTIONS
RTC in 2-3 weeks for HTN check     Hold amlodipine for now. Miralax ordered for constipation    Increase Lantus to 25 units inject at night    Take B/p daily and record. Bring log to next visit in 3 weeks. Record blood sugars daily fasting and if you are not feeling wel. Bring log to next visit in 3 weeks.

## 2022-04-08 ENCOUNTER — CARE COORDINATION (OUTPATIENT)
Dept: CASE MANAGEMENT | Age: 77
End: 2022-04-08

## 2022-04-08 NOTE — CARE COORDINATION
Trav 45 Transitions Follow Up Call    2022    Patient: Talha Brambila  Patient : 1945   MRN: [id number]  Reason for Admission:  Hyperglycemia  Discharge Date: 3/30/22 RARS: Readmission Risk Score: 17.2 ( )         Spoke with: leola    [facility] attempted outreach for care transition follow up call. Left HIPPA compliant message and contact information for call back. Care Transitions Subsequent and Final Call    Subsequent and Final Calls  Care Transitions Interventions  Other Interventions:            Follow Up  Future Appointments   Date Time Provider Janet Pollard   4/15/2022 10:00 AM Ilene Ayers MD [facility]   2022  1:45 PM Letitia Ch MD [place]       Kade Rush LPN

## 2022-04-11 ENCOUNTER — CARE COORDINATION (OUTPATIENT)
Dept: CASE MANAGEMENT | Age: 77
End: 2022-04-11

## 2022-04-11 NOTE — CARE COORDINATION
Trav 45 Transitions Follow Up Call    2022    Patient: Jeronimo Gary   Patient : 1945   MRN: [id number]   Reason for Admission: Hyperglycemia, CHF  Discharge Date: 3/30/22 RARS: Readmission Risk Score: 17.2 ( )         Spoke with: [id number] [facility] [address] Transitions Subsequent and Final Call    Schedule Follow Up Appointment with PCP: Declined  Subsequent and Final Calls  Do you have any ongoing symptoms?: No  Have your medications changed?: Yes  Patient Reports: started on Basaglar 25 units, lipitor 40 mg daily and stop amlodipine  Do you have any questions related to your medications?: No  Do you currently have any active services?: Yes  Are you currently active with any services?: Home Health  Do you have any needs or concerns that I can assist you with?: No  Identified Barriers: None  Care Transitions Interventions  No Identified Needs   Home Care Waiver: Completed     Other Interventions:         Summary  CTN spoke with patient this afternoon for follow up CTN call. Patient states she is doing well, reporting no complaints of any fever, chills, nausea, vomiting, chest pain, SOB or cough. Patient with no congestion, pain, difficulty emptying bladder, LE edema, feeling lightheaded, dizziness, and heart palpitations. Patient states she is following low sodium and low carbs, no sugar diets. Patient recently had follow up with PCP, was started on Lipitor and stopped Amlodipine. Chintan Fortis was increased to 25 Units QHS, from 20 units. Patient states she dropped back down to 20 units of insulin, due to the 25 units dropping her FSBS's down to 40's in am.  CTN will forward message to PCP, instructed to continue to monitor FSBS's and weight daily. Report any 3 lb weight gain overnight, or 5 lb weight gain in a week, reporting to MD immediately. No other issues or concerns, no new or changed medications at this time.    CTN provided education on s/s that require medical attention and when to seek medical attention. CTN advised Pt of use urgent care or physicians 24 hr access line if assistance is needed after hours or on the weekend. Pt denies any needs or concerns and is agreeable with additional calls. Patient instructed to continue to monitor for any of the above s/s, reporting any that may present to MD immediately.       Follow Up  Future Appointments   Date Time Provider Janet Pollard   4/15/2022 10:00 AM Neda Ahumada, MD [facility]   4/27/2022  1:45 PM Ginette Chew MD [facility]       David Lemon RN

## 2022-04-14 ENCOUNTER — CARE COORDINATION (OUTPATIENT)
Dept: CASE MANAGEMENT | Age: 77
End: 2022-04-14

## 2022-04-14 NOTE — CARE COORDINATION
Trav 45 Transitions Follow Up Call    2022    Patient: Talha Brambila   Patient : 1945   MRN: [id number]   Reason for Admission: CHF, Hypoglycemia  Discharge Date: 3/30/22 RARS: Readmission Risk Score: 17.2 ( )         Spoke with: [id number] Transitions Subsequent and Final Call    Schedule Follow Up Appointment with PCP: Declined  Subsequent and Final Calls  Do you have any ongoing symptoms?: No  Have your medications changed?: No  Do you have any questions related to your medications?: No  Do you currently have any active services?: Yes  Are you currently active with any services?: Home Health  Do you have any needs or concerns that I can assist you with?: No  Identified Barriers: None  Care Transitions Interventions  No Identified Needs   Home Care Waiver: Completed     Other Interventions:         Summary  CTN spoke with patient this afternoon for follow up CTN call. Patient states she is continuing to do well, states she is not having any fever, chills, nausea, vomiting, chest pain, SOB or cough. Patient states she had a 1/2 PB&J sandwich for a snack last night, sugar was higher this am, states she knows it was due to the jelly, as it was not sugar free. Patient instructed to watch low carb, no sugar diet. Patient also instructed to continue to monitor for any of the above s/s, reporting any that may present to MD immediately. Patient also instructed to continue to monitor FSBS's as ordered. No other issues or concerns, no new or changed medications at this time. CTN provided education on s/s that require medical attention and when to seek medical attention. CTN advised Pt of use urgent care or physicians 24 hr access line if assistance is needed after hours or on the weekend. Pt denies any needs or concerns and is agreeable with additional calls. Patient instructed to continue to monitor for any of the above s/s, reporting any that may present to MD immediately. Follow Up  Future Appointments   Date Time Provider Janet Pollard   4/15/2022 10:00 AM Dave Winter MD [facility]   4/27/2022  1:45 PM Shruti Cheatham MD [facility]       Anamaria Haynes RN

## 2022-04-15 ENCOUNTER — OFFICE VISIT (OUTPATIENT)
Dept: CARDIOLOGY CLINIC | Age: 77
End: 2022-04-15
Payer: MEDICARE

## 2022-04-15 VITALS
WEIGHT: 179 LBS | BODY MASS INDEX: 30.73 KG/M2 | SYSTOLIC BLOOD PRESSURE: 130 MMHG | DIASTOLIC BLOOD PRESSURE: 70 MMHG | HEART RATE: 72 BPM

## 2022-04-15 DIAGNOSIS — I48.0 PAROXYSMAL ATRIAL FIBRILLATION (HCC): ICD-10-CM

## 2022-04-15 DIAGNOSIS — I10 ESSENTIAL HYPERTENSION: ICD-10-CM

## 2022-04-15 DIAGNOSIS — I50.32 CHRONIC DIASTOLIC CONGESTIVE HEART FAILURE (HCC): Primary | ICD-10-CM

## 2022-04-15 PROCEDURE — 1111F DSCHRG MED/CURRENT MED MERGE: CPT | Performed by: INTERNAL MEDICINE

## 2022-04-15 PROCEDURE — 1090F PRES/ABSN URINE INCON ASSESS: CPT | Performed by: INTERNAL MEDICINE

## 2022-04-15 PROCEDURE — 1036F TOBACCO NON-USER: CPT | Performed by: INTERNAL MEDICINE

## 2022-04-15 PROCEDURE — 1123F ACP DISCUSS/DSCN MKR DOCD: CPT | Performed by: INTERNAL MEDICINE

## 2022-04-15 PROCEDURE — 99214 OFFICE O/P EST MOD 30 MIN: CPT | Performed by: INTERNAL MEDICINE

## 2022-04-15 PROCEDURE — G8417 CALC BMI ABV UP PARAM F/U: HCPCS | Performed by: INTERNAL MEDICINE

## 2022-04-15 PROCEDURE — G8400 PT W/DXA NO RESULTS DOC: HCPCS | Performed by: INTERNAL MEDICINE

## 2022-04-15 PROCEDURE — 4040F PNEUMOC VAC/ADMIN/RCVD: CPT | Performed by: INTERNAL MEDICINE

## 2022-04-15 PROCEDURE — G8427 DOCREV CUR MEDS BY ELIG CLIN: HCPCS | Performed by: INTERNAL MEDICINE

## 2022-04-15 ASSESSMENT — ENCOUNTER SYMPTOMS
CHEST TIGHTNESS: 0
COUGH: 0
CHOKING: 0
SHORTNESS OF BREATH: 0

## 2022-04-15 NOTE — PROGRESS NOTES
Subjective:      Patient ID: Rita Mendoza is a 64 y.o. female. Congestive Heart Failure  Pertinent negatives include no chest pain, fatigue, palpitations or shortness of breath. Hypertension  Pertinent negatives include no chest pain, palpitations or shortness of breath. Here for follow up afib/HTN/CHF. No complaints. Feeling good. No sob. No pnd or orthopnea. Active. BP good at home. No tachycardia. Rhythm stable. Past Medical History:   Diagnosis Date    Atrial fibrillation (HCC)     CKD (chronic kidney disease) stage 3, GFR 30-59 ml/min (HCC)     Diabetes mellitus type II     Diastolic CHF, chronic (HCC)     HTN (hypertension)     Hyperlipidemia     Tobacco abuse      Past Surgical History:   Procedure Laterality Date    EYE SURGERY Right      Social History     Socioeconomic History    Marital status:      Spouse name: Not on file    Number of children: Not on file    Years of education: Not on file    Highest education level: Not on file   Occupational History    Not on file   Tobacco Use    Smoking status: Former Smoker     Packs/day: 0.33     Years: 50.00     Pack years: 16.50     Types: Cigarettes     Quit date:      Years since quittin.2    Smokeless tobacco: Former User     Types: Snuff, Chew    Tobacco comment: Trying to quit   Vaping Use    Vaping Use: Never used   Substance and Sexual Activity    Alcohol use: Not Currently     Comment: 5 glasses of Fabi/week (mixed with ice/water)    Drug use: Never    Sexual activity: Not Currently   Other Topics Concern    Not on file   Social History Narrative    Not on file     Social Determinants of Health     Financial Resource Strain:     Difficulty of Paying Living Expenses: Not on file   Food Insecurity:     Worried About Running Out of Food in the Last Year: Not on file    Shona of Food in the Last Year: Not on file   Transportation Needs:     Lack of Transportation (Medical):  Not on file    Lack of Transportation (Non-Medical): Not on file   Physical Activity:     Days of Exercise per Week: Not on file    Minutes of Exercise per Session: Not on file   Stress:     Feeling of Stress : Not on file   Social Connections:     Frequency of Communication with Friends and Family: Not on file    Frequency of Social Gatherings with Friends and Family: Not on file    Attends Sikhism Services: Not on file    Active Member of [address] [place] [address] or Organizations: Not on file    Attends Club or Organization Meetings: Not on file    Marital Status: Not on file   Intimate Partner Violence:     Fear of Current or Ex-Partner: Not on file    Emotionally Abused: Not on file    Physically Abused: Not on file    Sexually Abused: Not on file   Housing Stability:     Unable to Pay for Housing in the Last Year: Not on file    Number of [place] in the Last Year: Not on file    Unstable Housing in the Last Year: Not on file     FH reviewed, denies FH cardiac issues    Vitals:    04/15/22 1009   BP: 130/70   Pulse: 72         Wt 178 (down 10 lbs)      Review of Systems   Constitutional: Negative for activity change, appetite change and fatigue. Respiratory: Negative for cough, choking, chest tightness and shortness of breath. Cardiovascular: Negative for chest pain, palpitations and leg swelling. Denies PND or orthopnea. No tachycardia or syncope. Neurological: Negative for dizziness, syncope and light-headedness. Psychiatric/Behavioral: Negative for agitation, behavioral problems and confusion. All other systems reviewed negative as done        Objective:   Physical Exam  Constitutional:       General: She is not in acute distress. Appearance: She is well-developed. HENT:      Head: Normocephalic and atraumatic. Eyes:      General:         Right eye: No discharge. Left eye: No discharge. Conjunctiva/sclera: Conjunctivae normal.   Neck:      Vascular: No JVD.    Cardiovascular: Rate and Rhythm: Normal rate and regular rhythm. Pulses:           Radial pulses are 2+ on the right side and 2+ on the left side. Heart sounds: Normal heart sounds, S1 normal and S2 normal. No murmur heard. No gallop. Pulmonary:      Effort: Pulmonary effort is normal. No respiratory distress. Breath sounds: Normal breath sounds. No wheezing or rales. Abdominal:      General: Bowel sounds are normal.      Palpations: Abdomen is soft. Tenderness: There is no abdominal tenderness. Musculoskeletal:         General: Normal range of motion. Cervical back: Normal range of motion. Skin:     General: Skin is warm and dry. Neurological:      Mental Status: She is alert and oriented to person, place, and time. Psychiatric:         Behavior: Behavior normal.         Thought Content: Thought content normal.         Assessment:       Diagnosis Orders   1. Chronic diastolic congestive heart failure (HCC)     2. Paroxysmal atrial fibrillation ([address]     3. Essential hypertension             Plan:      CV stable. Feeling good. Rhythm stable. BP reasonable at home. Will continue coreg 25 mg bid/lasix 40mg qd/lisinopril 40 mg qd for CHF/HTN. Coreg for HR control for afib. Wt down. Reviewed previous records and testing including echo 8/19. No changes. Continue to monitor. Follow up 6 months.

## 2022-04-18 ENCOUNTER — CARE COORDINATION (OUTPATIENT)
Dept: CASE MANAGEMENT | Age: 77
End: 2022-04-18

## 2022-04-18 NOTE — CARE COORDINATION
Trav [address] Transitions Follow Up Call    2022    Patient: Court Tellez   Patient : 1945   MRN: [id number]   Reason for Admission: Hypoglycemia, CHF  Discharge Date: 3/30/22 RARS: Readmission Risk Score: 17.2 ( )         Spoke with: [id number] Transitions Subsequent and Final Call    Schedule Follow Up Appointment with PCP: Declined  Subsequent and Final Calls  Do you have any ongoing symptoms?: No  Have your medications changed?: No  Do you have any questions related to your medications?: No  Do you currently have any active services?: No  Are you currently active with any services?: Home Health  Do you have any needs or concerns that I can assist you with?: No  Identified Barriers: None  Care Transitions Interventions  No Identified Needs   Home Care Waiver: Completed     Other Interventions:         Summary  CTN spoke with patient this afternoon for follow up CTN call. Patient states she is doing well, reporting no complaints of any fever, chills, nausea, vomiting, chest pain, SOB or cough. Patient with no congestion, pain, difficulty emptying bladder, LE edema, feeling lightheaded, dizziness, and heart palpitations. Patient states FSBS's have been running between 's. No weight gain, no BLE Edema, no weight gain, at present. CTN encouraged patient to continue to monitor for any of the above s/s,  Reporting any that may present to MD immediately. Patient states [facility] agency SN, PT/OT are no longer following her, all disciplines have signed off. No other issues or concerns, no new or changed medications at this time. CTN encouraged patient to continue to monitor weight daily as well, reporting any 3 lb weight gain overnight, 5 lb weight gain in a week. CTN provided education on s/s that require medical attention and when to seek medical attention.   CTN advised Pt of use urgent care or physicians 24 hr access line if assistance is needed after hours or on the weekend. Pt denies any needs or concerns and is agreeable with additional calls. Patient instructed to continue to monitor for any of the above s/s, reporting any that may present to MD immediately.       Follow Up  Future Appointments   Date Time Provider Janet Pollard   4/27/2022  1:45 PM Netta Sánchez MD [facility]   10/28/2022 10:00 AM [facility]       Jesika Aguilar RN

## 2022-04-22 ENCOUNTER — CARE COORDINATION (OUTPATIENT)
Dept: CASE MANAGEMENT | Age: 77
End: 2022-04-22

## 2022-04-22 NOTE — CARE COORDINATION
Trav 45 Transitions Follow Up Call    2022    Patient: Barbara Saleh   Patient : 1945   MRN: [id number]   Reason for Admission: Hyperglycemia, CHF  Discharge Date: 3/30/22 RARS: Readmission Risk Score: 17.2 ( )         Spoke with: [id number] Transitions Subsequent and Final Call    Schedule Follow Up Appointment with PCP: Declined  Subsequent and Final Calls  Do you have any ongoing symptoms?: No  Have your medications changed?: No  Do you have any questions related to your medications?: No  Do you currently have any active services?: No  Are you currently active with any services?: Home Health  Do you have any needs or concerns that I can assist you with?: No  Identified Barriers: None  Care Transitions Interventions  No Identified Needs   Home Care Waiver: Completed     Other Interventions: Follow Up  Future Appointments   Date Time Provider Janet Pollard   2022  1:45 PM Kandy Mathew MD [address] [place] [address]   10/28/2022 10:00 AM Ava Hubbard MD [facility]     Summary  CTN spoke with patient this afternoon for follow up CTN call. Patient states she is doing well, reporting no complaints of any fever, chills, nausea, vomiting, chest pain, SOB or cough. Denies any congestion, pain, difficulty emptying bladder, LE edema, feeling lightheaded, dizziness, and heart palpitations. Patient reporting her FSBS's are running well also. Patient with questions regarding when she should take something to bring sugars up. States FSBS's have been in the 90's, at times. CTN encouraged patient that this is not bad, is actually pretty good. Patient instructed that anything 60 or under would require a 15 g carb to raise reading.   Patient instructed again on what snacks are good in helping with bringing sugar up quickly, advised against candy bars, ice cream, etc.  Patient instructed to continue to monitor for any of the other above s/s, reporting any that may present to MD immediately. No other issues or concerns, no new or changed medications at this time. SN with [facility] agency no longer following, as service was no longer needed. CTN provided education on s/s that require medical attention and when to seek medical attention. CTN advised Pt of use urgent care or physicians 24 hr access line if assistance is needed after hours or on the weekend. Pt denies any needs or concerns and is agreeable with additional calls. Patient instructed to continue to monitor for any of the above s/s, reporting any that may present to MD immediately. Care Transitions Follow Up Call    Needs to be reviewed by the provider   Additional needs identified to be addressed with provider: No  none             Method of communication with provider : none      Verified name and  with patient as identifiers. Addressed changes since last contact: none  Discussed follow-up appointments. If no appointment was previously scheduled, appointment scheduling offered: No.   Is follow up appointment scheduled within 7 days of discharge? No.  Patient has follow up with PCP for 2022. Advance Care Planning:   Does patient have an Advance Directive: not on file. CTN reviewed discharge instructions, medical action plan and red flags with patient and discussed any barriers to care and/or understanding of plan of care after discharge. Discussed appropriate site of care based on symptoms and resources available to patient including: PCP  Urgent care clinics  When to call 911. The patient agrees to contact the PCP office for questions related to their healthcare. Patients top risk factors for readmission: medical condition-Hyperglycemia, CHF  Interventions to address risk factors: Education of patient/family/caregiver/guardian to support self-management-Hyperglycemia/Hypoglycemia    CTN provided contact information for future needs.  Plan for follow-up call in 3-5 days based on severity

## 2022-04-25 ENCOUNTER — CARE COORDINATION (OUTPATIENT)
Dept: CASE MANAGEMENT | Age: 77
End: 2022-04-25

## 2022-04-25 NOTE — CARE COORDINATION
Trav 45 Transitions Initial Follow Up Call    Call within 2 business days of discharge: Yes    Patient:  Karlee Potts   Patient :  1945  MRN:  [id number]    Reason for Admission:  Hyperglycemia, CHF  Discharge Date:  3/30/22   RARS:       Non-face-to-face services provided:    1ST CTC attempt to reach Pt regarding recent hospital discharge. CTC left voice recording with call back number requesting a call back.     Follow up appointments:    Future Appointments   Date Time Provider Janet Pollard   2022  1:45 PM Gian Patel MD [address] [place] [address]   10/28/2022 10:00 AM Natalio Moreau MD [facility]       Thank You,    Michael Lan RN  Care Transition Coordinator  Contact KCWSVR:[phone]

## 2022-04-28 ENCOUNTER — CARE COORDINATION (OUTPATIENT)
Dept: CASE MANAGEMENT | Age: 77
End: 2022-04-28

## 2022-04-28 NOTE — CARE COORDINATION
Trav 45 Transitions Follow Up Call    2022    Patient: Tristin Beckham  Patient : 1945   MRN: <[id number]>  Reason for Admission: hyperglycemia  Discharge Date: 3/30/22 RARS: Readmission Risk Score: 17.2 ( )         Spoke with: KAYLEEN    Second and final attempt to reach patient via phone for transition call. VM left stating purpose of call along with my contact information requesting a return call. Episode ended d/t unsuccessful contact. Taina Kent LPN [address] [place] [address]  [phone]      Care Transitions Subsequent and Final Call    Subsequent and Final Calls  Are you currently active with any services?:  [facility]   Home Care Waiver: Completed     Other Interventions:            Follow Up  Future Appointments   Date Time Provider Janet Pollard   2022  1:45 PM Marland Sat, MD Leta Dakin [facility]   10/28/2022 10:00 AM [facility]       Taina Kent LPN

## 2022-05-02 ENCOUNTER — OFFICE VISIT (OUTPATIENT)
Dept: INTERNAL MEDICINE CLINIC | Age: 77
End: 2022-05-02
Payer: MEDICARE

## 2022-05-02 VITALS
WEIGHT: 185.6 LBS | TEMPERATURE: 96.4 F | SYSTOLIC BLOOD PRESSURE: 131 MMHG | DIASTOLIC BLOOD PRESSURE: 79 MMHG | HEART RATE: 81 BPM | OXYGEN SATURATION: 100 % | HEIGHT: 64 IN | BODY MASS INDEX: 31.69 KG/M2

## 2022-05-02 DIAGNOSIS — E11.65 UNCONTROLLED TYPE 2 DIABETES MELLITUS WITH HYPERGLYCEMIA (HCC): ICD-10-CM

## 2022-05-02 DIAGNOSIS — I10 PRIMARY HYPERTENSION: Primary | Chronic | ICD-10-CM

## 2022-05-02 LAB
GLUCOSE BLD-MCNC: 153 MG/DL (ref 70–99)
PERFORMED ON: ABNORMAL

## 2022-05-02 PROCEDURE — 99213 OFFICE O/P EST LOW 20 MIN: CPT | Performed by: STUDENT IN AN ORGANIZED HEALTH CARE EDUCATION/TRAINING PROGRAM

## 2022-05-02 RX ORDER — INSULIN GLARGINE 100 [IU]/ML
20 INJECTION, SOLUTION SUBCUTANEOUS NIGHTLY
Qty: 4 PEN | Refills: 0 | Status: SHIPPED | OUTPATIENT
Start: 2022-05-02 | End: 2022-10-28

## 2022-05-02 NOTE — PROGRESS NOTES
Outpatient Clinic Established Patient Note    Patient: Ralph Molina  : 1945 (64 y.o.)  Date: 2022    CC: 3 week f/u BP check    HPI:      Pt is a 63 y/o female with a PMHx of T2DM, CKD, HTN, HLD, CHF, PAF, and depression who presented to the clinic for a 3 week f/u to check her BP. Pt's BP was 97/63 during last visit on 2022. At that time Norvasc was discontinued. Pt states that she has been monitoring her BP at home, notes that SBP in 130s since last visit, and her BP was 131,79 during this visit. Pt's last Hgb A1c was more than 18.0 on 3/28/2022, and she was admitted to the hospital for hyperglycemia at that time. The pt stated that she has been trying to better control her blood sugar. Her Lantus dosage was increased from 20u to 25u qHS following her hospital stay. Pt stated that her blood sugar had been as low as in the 60s, she stated that she went back to taking Lantus 20u qHS. Pt's glucometer the following readings: twenty eight day average in the 160s, fourteen day average in the 140s and seven day average in the 120s. Random blood sugar at 2 PM during this visit (2022) was 153. Home Meds:  Prior to Visit Medications    Medication Sig Taking?  Authorizing Provider   insulin glargine (BASAGLAR KWIKPEN) 100 UNIT/ML injection pen Inject 20 Units into the skin nightly Yes Bryn Duverney, DO   atorvastatin (LIPITOR) 40 MG tablet Take 1 tablet by mouth daily Yes Johny Strickland MD   polyethylene glycol [facility]) 17 GM/SCOOP powder Take 17 g by mouth 2 times daily as needed (constipation) Yes Johny Strickland MD   Insulin Pen Needle (PEN NEEDLES) 31G X 6 MM MISC Use with basaglar nightly Yes Jailene Clay MD   ezetimibe (ZETIA) 10 MG tablet TAKE 1 TABLET BY MOUTH ONE TIME A DAY Yes Verda Landau, MD   Dulaglutide (TRULICITY) 1.5 NH/9.3CD SOPN Inject 1.5 mg into the skin once a week Yes Dominguez Burkett MD   blood glucose monitor kit and supplies Dispense sufficient amount for indicated testing frequency plus additional to accommodate PRN testing needs. Dispense all needed supplies to include: monitor, strips, lancing device, lancets, control solutions, alcohol swabs. Yes Marian Singh MD   carvedilol (COREG) 25 MG tablet take 1 tablet by mouth 2 times a day with meals Yes Erma Weaver MD   lisinopril (PRINIVIL;ZESTRIL) 40 MG tablet TAKE ONE TABLET BY MOUTH ONE TIME DAILY Yes Erma Weaver MD   dapagliflozin (FARXIGA) 10 MG tablet Take 1 tablet by mouth every morning Yes Erma Weaver MD   glimepiride (AMARYL) 4 MG tablet Take 1 tablet by mouth every morning (before breakfast) Yes Marian Singh MD   Ramiro Ellis [address] [facility] by Does not apply route Start date: 10/27/21  End date 10/26/26 Yes Marian Singh MD   furosemide (LASIX) 40 MG tablet TAKE 1 TABLET BY MOUTH ONE TIME A DAY Yes Tex Reyes MD   Calcium-Vitamin D 600-200 MG-UNIT TABS Take 1 tablet by mouth 2 times daily Yes Elizabeth Orellana MD   aspirin (ASPIRIN CHILDRENS) 81 MG chewable tablet Take 1 tablet by mouth daily Yes Elizabeth Orellana MD   vitamin D3 (CHOLECALCIFEROL) 25 MCG (1000 UT) TABS tablet Take 1 tablet by mouth daily Yes Elizabeth Orellana MD       Allergies:    Patient has no known allergies. Health Maintenance Due   Topic Date Due    Annual Wellness Visit (AWV)  Never done    Pneumococcal 65+ years Vaccine (1 - PCV) Never done    Depression Monitoring  Never done    Hepatitis C screen  Never done    DTaP/Tdap/Td vaccine (1 - Tdap) Never done    Shingles vaccine (1 of 2) Never done    DEXA (modify frequency per FRAX score)  Never done         There is no immunization history on file for this patient. Review of Systems  A 10-organ Review Of Systems was obtained and otherwise unremarkable except as per HPI. Data: Old records have been reviewed electronically.     PHYSICAL EXAM:  /79 (Site: Left Upper Arm, Position: Sitting, Cuff Size: Medium Adult)   Pulse 81   Temp 96.4 °F (35.8 °C) (Temporal) Ht 5' 4\" (1.626 m)   Wt 185 lb 9.6 oz (84.2 kg)   SpO2 100%   BMI 31.86 kg/m²   Physical Exam    Assessment & Plan:      1. Uncontrolled type 2 diabetes mellitus with hyperglycemia ([facility] [address]  -Random Blood Sugar 153 at 2 PM on 05/02/2022   -Continue current medications of Lantus, Trulicity, Farxiga, and Amaryl  -Please continue checking your blood sugar daily  -Follow up in 2 months. We will check your Hgb A1c at that time  - Re-ordered insulin glargine (BASAGLAR KWIKPEN) 100 UNIT/ML injection pen; Inject 20 Units into the skin nightly  Dispense: 4 pen; Refill: 0    2. Primary hypertension  Last month SBP 97, pt's Norvasc was discontinued. SBP in 130s during this visit.    -Continue current medications of Coreg, and Lisinopril  -Please check your blood pressure daily after taking your blood pressure medication    Return in about 2 months (around 7/2/2022) for f/u for DM.     Dispo: Pt has been staffed with Dr. Daniel Enciso.  _______________  Megan Ponce DO, 5/2/2022 5:55 PM   PGY-1

## 2022-05-02 NOTE — PATIENT INSTRUCTIONS
Diabetes  -Continue current medications of Lantus, Trulicity, Farxiga, and Amaryl.  -Please continue checking your blood sugar daily  -Follow up in 2 months.  We will check your Hgb A1c at that time    Hypertension  -Continue current medications of Coreg, and Lisinopril  -Please check your blood pressure daily after taking your blood pressure medication

## 2022-06-06 DIAGNOSIS — E11.8 TYPE 2 DIABETES MELLITUS WITH COMPLICATION, WITHOUT LONG-TERM CURRENT USE OF INSULIN (HCC): ICD-10-CM

## 2022-06-06 DIAGNOSIS — I10 ESSENTIAL HYPERTENSION: ICD-10-CM

## 2022-06-07 RX ORDER — GLIMEPIRIDE 4 MG/1
TABLET ORAL
Qty: 60 TABLET | Refills: 3 | Status: SHIPPED | OUTPATIENT
Start: 2022-06-07

## 2022-06-07 RX ORDER — FUROSEMIDE 40 MG/1
TABLET ORAL
Qty: 30 TABLET | Refills: 1 | Status: SHIPPED | OUTPATIENT
Start: 2022-06-07 | End: 2022-08-31

## 2022-06-29 ENCOUNTER — TELEPHONE (OUTPATIENT)
Dept: INTERNAL MEDICINE CLINIC | Age: 77
End: 2022-06-29

## 2022-06-29 DIAGNOSIS — E11.65 UNCONTROLLED TYPE 2 DIABETES MELLITUS WITH HYPERGLYCEMIA (HCC): ICD-10-CM

## 2022-06-29 RX ORDER — GLUCOSAMINE HCL/CHONDROITIN SU 500-400 MG
CAPSULE ORAL
Qty: 100 STRIP | Refills: 3 | OUTPATIENT
Start: 2022-06-29

## 2022-07-19 ENCOUNTER — TELEPHONE (OUTPATIENT)
Dept: CASE MANAGEMENT | Age: 77
End: 2022-07-19

## 2022-07-19 NOTE — TELEPHONE ENCOUNTER
Pt due for lung nodule follow-up Chest CT as ordered by Dr. Leonila Fatima. Reminder letter mailed.     Javid HOBBSN, RN   Lung Nodule Navigator  [facility]  [phone]

## 2022-07-21 RX ORDER — BLOOD SUGAR DIAGNOSTIC
STRIP MISCELLANEOUS
Qty: 100 STRIP | Refills: 3 | Status: SHIPPED | OUTPATIENT
Start: 2022-07-21

## 2022-07-28 DIAGNOSIS — E78.00 PURE HYPERCHOLESTEROLEMIA: ICD-10-CM

## 2022-07-28 DIAGNOSIS — I10 ESSENTIAL HYPERTENSION: ICD-10-CM

## 2022-07-28 RX ORDER — LISINOPRIL 40 MG/1
TABLET ORAL
Qty: 30 TABLET | Refills: 3 | Status: SHIPPED | OUTPATIENT
Start: 2022-07-28

## 2022-07-28 RX ORDER — EZETIMIBE 10 MG/1
TABLET ORAL
Qty: 30 TABLET | Refills: 2 | Status: SHIPPED | OUTPATIENT
Start: 2022-07-28

## 2022-08-09 ENCOUNTER — TELEPHONE (OUTPATIENT)
Dept: CASE MANAGEMENT | Age: 77
End: 2022-08-09

## 2022-08-09 NOTE — TELEPHONE ENCOUNTER
Pt due for follow-up chest CT. Order in place. Called pt to offer assistance with scheduling. Family member answered phone, pt not available, left number for callback.     Erickson HOBBSN, RN   Lung Nodule Navigator  [facility]  [phone]

## 2022-08-31 DIAGNOSIS — I10 ESSENTIAL HYPERTENSION: ICD-10-CM

## 2022-08-31 RX ORDER — FUROSEMIDE 40 MG/1
TABLET ORAL
Qty: 30 TABLET | Refills: 0 | Status: SHIPPED | OUTPATIENT
Start: 2022-08-31 | End: 2022-10-05

## 2022-10-03 DIAGNOSIS — I10 ESSENTIAL HYPERTENSION: ICD-10-CM

## 2022-10-03 DIAGNOSIS — E78.2 MIXED HYPERLIPIDEMIA: Chronic | ICD-10-CM

## 2022-10-05 RX ORDER — FUROSEMIDE 40 MG/1
TABLET ORAL
Qty: 30 TABLET | Refills: 0 | Status: SHIPPED | OUTPATIENT
Start: 2022-10-05

## 2022-10-05 RX ORDER — DAPAGLIFLOZIN 10 MG/1
TABLET, FILM COATED ORAL
Qty: 30 TABLET | Refills: 0 | Status: SHIPPED | OUTPATIENT
Start: 2022-10-05

## 2022-10-05 RX ORDER — ATORVASTATIN CALCIUM 40 MG/1
TABLET, FILM COATED ORAL
Qty: 30 TABLET | Refills: 3 | Status: SHIPPED | OUTPATIENT
Start: 2022-10-05

## 2022-10-28 ENCOUNTER — OFFICE VISIT (OUTPATIENT)
Dept: CARDIOLOGY CLINIC | Age: 77
End: 2022-10-28
Payer: MEDICARE

## 2022-10-28 VITALS
SYSTOLIC BLOOD PRESSURE: 102 MMHG | WEIGHT: 173.4 LBS | BODY MASS INDEX: 29.76 KG/M2 | HEART RATE: 78 BPM | DIASTOLIC BLOOD PRESSURE: 60 MMHG

## 2022-10-28 DIAGNOSIS — I48.0 PAROXYSMAL ATRIAL FIBRILLATION (HCC): ICD-10-CM

## 2022-10-28 DIAGNOSIS — I10 ESSENTIAL HYPERTENSION: ICD-10-CM

## 2022-10-28 DIAGNOSIS — I50.32 CHRONIC DIASTOLIC CONGESTIVE HEART FAILURE (HCC): Primary | ICD-10-CM

## 2022-10-28 PROCEDURE — G8427 DOCREV CUR MEDS BY ELIG CLIN: HCPCS | Performed by: INTERNAL MEDICINE

## 2022-10-28 PROCEDURE — G8400 PT W/DXA NO RESULTS DOC: HCPCS | Performed by: INTERNAL MEDICINE

## 2022-10-28 PROCEDURE — G8417 CALC BMI ABV UP PARAM F/U: HCPCS | Performed by: INTERNAL MEDICINE

## 2022-10-28 PROCEDURE — 1090F PRES/ABSN URINE INCON ASSESS: CPT | Performed by: INTERNAL MEDICINE

## 2022-10-28 PROCEDURE — G8484 FLU IMMUNIZE NO ADMIN: HCPCS | Performed by: INTERNAL MEDICINE

## 2022-10-28 PROCEDURE — 1123F ACP DISCUSS/DSCN MKR DOCD: CPT | Performed by: INTERNAL MEDICINE

## 2022-10-28 PROCEDURE — 3074F SYST BP LT 130 MM HG: CPT | Performed by: INTERNAL MEDICINE

## 2022-10-28 PROCEDURE — 3078F DIAST BP <80 MM HG: CPT | Performed by: INTERNAL MEDICINE

## 2022-10-28 PROCEDURE — 1036F TOBACCO NON-USER: CPT | Performed by: INTERNAL MEDICINE

## 2022-10-28 PROCEDURE — 99214 OFFICE O/P EST MOD 30 MIN: CPT | Performed by: INTERNAL MEDICINE

## 2022-10-28 ASSESSMENT — ENCOUNTER SYMPTOMS
CHOKING: 0
CHEST TIGHTNESS: 0
COUGH: 0
SHORTNESS OF BREATH: 0

## 2022-10-28 NOTE — PROGRESS NOTES
Subjective:      Patient ID: Jyothi Aceves is a 64 y.o. female. Here for follow up afib/HTN/CHF. No complaints. Feeling good. No sob. No pnd or orthopnea. Active. No edema. BP good at home. No tachycardia/syncope. Rhythm stable. Past Medical History:   Diagnosis Date    Atrial fibrillation (HCC)     CKD (chronic kidney disease) stage 3, GFR 30-59 ml/min (HCC)     Diabetes mellitus type II     Diastolic CHF, chronic (HCC)     HTN (hypertension)     Hyperlipidemia     Tobacco abuse      Past Surgical History:   Procedure Laterality Date    EYE SURGERY Right      Social History     Socioeconomic History    Marital status:      Spouse name: Not on file    Number of children: Not on file    Years of education: Not on file    Highest education level: Not on file   Occupational History    Not on file   Tobacco Use    Smoking status: Former     Packs/day: 0.33     Years: 50.00     Pack years: 16.50     Types: Cigarettes     Quit date:      Years since quittin.8    Smokeless tobacco: Former     Types: Snuff, Chew    Tobacco comments:     Trying to quit   Vaping Use    Vaping Use: Never used   Substance and Sexual Activity    Alcohol use: Not Currently     Comment: 5 glasses of Fabi/week (mixed with ice/water)    Drug use: Never    Sexual activity: Not Currently   Other Topics Concern    Not on file   Social History Narrative    Not on file     Social Determinants of Health     Financial Resource Strain: Not on file   Food Insecurity: Not on file   Transportation Needs: Not on file   Physical Activity: Not on file   Stress: Not on file   Social Connections: Not on file   Intimate Partner Violence: Not on file   Housing Stability: Not on file     FH reviewed, denies FH cardiac issues    Vitals:    10/28/22 1014   BP: 102/60   Pulse: 78         Wt 173 (down 5 lbs)      Review of Systems   Constitutional:  Negative for activity change, appetite change and fatigue.    Respiratory:  Negative for cough, choking, chest tightness and shortness of breath. Cardiovascular:  Negative for chest pain, palpitations and leg swelling. Denies PND or orthopnea. No tachycardia or syncope. Neurological:  Negative for dizziness, syncope and light-headedness. Psychiatric/Behavioral:  Negative for agitation, behavioral problems and confusion. All other systems reviewed negative as done        Objective:   Physical Exam  Constitutional:       General: She is not in acute distress. Appearance: She is well-developed. HENT:      Head: Normocephalic and atraumatic. Eyes:      General:         Right eye: No discharge. Left eye: No discharge. Conjunctiva/sclera: Conjunctivae normal.   Neck:      Vascular: No JVD. Cardiovascular:      Rate and Rhythm: Normal rate and regular rhythm. Pulses:           Radial pulses are 2+ on the right side and 2+ on the left side. Heart sounds: Normal heart sounds, S1 normal and S2 normal. No murmur heard. No gallop. Pulmonary:      Effort: Pulmonary effort is normal. No respiratory distress. Breath sounds: Normal breath sounds. No wheezing or rales. Abdominal:      General: Bowel sounds are normal.      Palpations: Abdomen is soft. Tenderness: There is no abdominal tenderness. Musculoskeletal:         General: Normal range of motion. Cervical back: Normal range of motion. Skin:     General: Skin is warm and dry. Neurological:      Mental Status: She is alert and oriented to person, place, and time. Psychiatric:         Behavior: Behavior normal.         Thought Content: Thought content normal.       Assessment:       Diagnosis Orders   1. Chronic diastolic congestive heart failure (HCC)        2. Paroxysmal atrial fibrillation ([address]        3. Essential hypertension                Plan:      CV stable. Feeling good. Rhythm stable. BP reasonable at home.   Will continue coreg 25 mg bid/lasix 40mg qd/lisinopril 40 mg qd for CHF/HTN. Coreg for HR control for afib. Wt down. Reviewed previous records and testing including echo 8/19. No changes. Continue to monitor. Follow up 6 months.

## 2023-01-04 DIAGNOSIS — E11.8 TYPE 2 DIABETES MELLITUS WITH COMPLICATION, WITHOUT LONG-TERM CURRENT USE OF INSULIN (HCC): ICD-10-CM

## 2023-01-04 DIAGNOSIS — I10 ESSENTIAL HYPERTENSION: ICD-10-CM

## 2023-01-04 DIAGNOSIS — E78.00 PURE HYPERCHOLESTEROLEMIA: ICD-10-CM

## 2023-01-04 RX ORDER — FUROSEMIDE 40 MG/1
TABLET ORAL
Qty: 30 TABLET | Refills: 1 | Status: SHIPPED | OUTPATIENT
Start: 2023-01-04

## 2023-01-04 RX ORDER — CARVEDILOL 25 MG/1
TABLET ORAL
Qty: 60 TABLET | Refills: 5 | Status: SHIPPED | OUTPATIENT
Start: 2023-01-04

## 2023-01-04 RX ORDER — GLIMEPIRIDE 4 MG/1
TABLET ORAL
Qty: 60 TABLET | Refills: 3 | Status: SHIPPED | OUTPATIENT
Start: 2023-01-04

## 2023-01-04 RX ORDER — EZETIMIBE 10 MG/1
TABLET ORAL
Qty: 30 TABLET | Refills: 2 | Status: SHIPPED | OUTPATIENT
Start: 2023-01-04

## 2023-01-04 NOTE — TELEPHONE ENCOUNTER
Requested Prescriptions     Pending Prescriptions Disp Refills    glimepiride (AMARYL) 4 MG tablet [Pharmacy Med Name: GLIMEPIRIDE 4MG TABS] 60 tablet 3     Sig: TAKE 1 TABLET BY MOUTH EVERY MORNING BEFORE BREAKFAST    dapagliflozin (FARXIGA) 10 MG tablet 30 tablet 1     Sig: TAKE ONE TABLET BY MOUTH EVERY MORNING    furosemide (LASIX) 40 MG tablet 30 tablet 1     Sig: TAKE 1 TABLET BY MOUTH DAILY       Last Clinic Visit:  5/2/2022     Next Clinic Appointment:  Visit date not found

## 2023-04-19 ENCOUNTER — HOSPITAL ENCOUNTER (EMERGENCY)
Age: 78
Discharge: HOME | End: 2023-04-20
Attending: EMERGENCY MEDICINE
Payer: MEDICARE

## 2023-04-19 DIAGNOSIS — E83.42 HYPOMAGNESEMIA: ICD-10-CM

## 2023-04-19 DIAGNOSIS — E87.6 HYPOKALEMIA: Primary | ICD-10-CM

## 2023-04-19 LAB
ALBUMIN SERPL-MCNC: 3.1 G/DL (ref 3.4–5)
ALBUMIN/GLOB SERPL: 0.8 {RATIO} (ref 1.1–2.2)
ALP SERPL-CCNC: 240 U/L (ref 40–129)
ALT SERPL-CCNC: 10 U/L (ref 10–40)
ANION GAP SERPL CALCULATED.3IONS-SCNC: 14 MMOL/L (ref 3–16)
AST SERPL-CCNC: 26 U/L (ref 15–37)
BASOPHILS # BLD: 0.1 K/UL (ref 0–0.2)
BASOPHILS NFR BLD: 0.5 %
BILIRUB SERPL-MCNC: 1.2 MG/DL (ref 0–1)
BUN SERPL-MCNC: 20 MG/DL (ref 7–20)
CALCIUM SERPL-MCNC: 8.7 MG/DL (ref 8.3–10.6)
CHLORIDE SERPL-SCNC: 95 MMOL/L (ref 99–110)
CO2 SERPL-SCNC: 21 MMOL/L (ref 21–32)
CREAT SERPL-MCNC: 1.4 MG/DL (ref 0.6–1.2)
DEPRECATED RDW RBC AUTO: 14.6 % (ref 12.4–15.4)
EOSINOPHIL # BLD: 0.1 K/UL (ref 0–0.6)
EOSINOPHIL NFR BLD: 0.5 %
GFR SERPLBLD CREATININE-BSD FMLA CKD-EPI: 38 ML/MIN/{1.73_M2}
GLUCOSE SERPL-MCNC: 497 MG/DL (ref 70–99)
HCT VFR BLD AUTO: 28.4 % (ref 36–48)
HGB BLD-MCNC: 9.2 G/DL (ref 12–16)
LYMPHOCYTES # BLD: 1 K/UL (ref 1–5.1)
LYMPHOCYTES NFR BLD: 8.6 %
MAGNESIUM SERPL-MCNC: 1.7 MG/DL (ref 1.8–2.4)
MCH RBC QN AUTO: 27.5 PG (ref 26–34)
MCHC RBC AUTO-ENTMCNC: 32.4 G/DL (ref 31–36)
MCV RBC AUTO: 85 FL (ref 80–100)
MONOCYTES # BLD: 0.9 K/UL (ref 0–1.3)
MONOCYTES NFR BLD: 7.1 %
NEUTROPHILS # BLD: 10.1 K/UL (ref 1.7–7.7)
NEUTROPHILS NFR BLD: 83.3 %
PLATELET # BLD AUTO: 208 K/UL (ref 135–450)
PMV BLD AUTO: 10.2 FL (ref 5–10.5)
POTASSIUM SERPL-SCNC: 2.6 MMOL/L (ref 3.5–5.1)
PROT SERPL-MCNC: 6.8 G/DL (ref 6.4–8.2)
RBC # BLD AUTO: 3.34 M/UL (ref 4–5.2)
SODIUM SERPL-SCNC: 130 MMOL/L (ref 136–145)
WBC # BLD AUTO: 12.1 K/UL (ref 4–11)

## 2023-04-19 PROCEDURE — 6360000002 HC RX W HCPCS: Performed by: EMERGENCY MEDICINE

## 2023-04-19 PROCEDURE — 6370000000 HC RX 637 (ALT 250 FOR IP): Performed by: EMERGENCY MEDICINE

## 2023-04-19 PROCEDURE — 85025 COMPLETE CBC W/AUTO DIFF WBC: CPT

## 2023-04-19 PROCEDURE — 96368 THER/DIAG CONCURRENT INF: CPT

## 2023-04-19 PROCEDURE — 36415 COLL VENOUS BLD VENIPUNCTURE: CPT

## 2023-04-19 PROCEDURE — 99284 EMERGENCY DEPT VISIT MOD MDM: CPT

## 2023-04-19 PROCEDURE — 80053 COMPREHEN METABOLIC PANEL: CPT

## 2023-04-19 PROCEDURE — 83735 ASSAY OF MAGNESIUM: CPT

## 2023-04-19 PROCEDURE — 96365 THER/PROPH/DIAG IV INF INIT: CPT

## 2023-04-19 PROCEDURE — 2580000003 HC RX 258: Performed by: EMERGENCY MEDICINE

## 2023-04-19 PROCEDURE — 96366 THER/PROPH/DIAG IV INF ADDON: CPT

## 2023-04-19 PROCEDURE — 93005 ELECTROCARDIOGRAM TRACING: CPT | Performed by: EMERGENCY MEDICINE

## 2023-04-19 RX ORDER — ACETAMINOPHEN 500 MG
1000 TABLET ORAL ONCE
Status: COMPLETED | OUTPATIENT
Start: 2023-04-19 | End: 2023-04-19

## 2023-04-19 RX ORDER — MAGNESIUM SULFATE IN WATER 40 MG/ML
2000 INJECTION, SOLUTION INTRAVENOUS ONCE
Status: COMPLETED | OUTPATIENT
Start: 2023-04-19 | End: 2023-04-19

## 2023-04-19 RX ORDER — POTASSIUM CHLORIDE 20 MEQ/1
60 TABLET, EXTENDED RELEASE ORAL ONCE
Status: COMPLETED | OUTPATIENT
Start: 2023-04-19 | End: 2023-04-19

## 2023-04-19 RX ORDER — 0.9 % SODIUM CHLORIDE 0.9 %
1000 INTRAVENOUS SOLUTION INTRAVENOUS ONCE
Status: COMPLETED | OUTPATIENT
Start: 2023-04-19 | End: 2023-04-19

## 2023-04-19 RX ORDER — POTASSIUM CHLORIDE 7.45 MG/ML
10 INJECTION INTRAVENOUS
Status: COMPLETED | OUTPATIENT
Start: 2023-04-19 | End: 2023-04-20

## 2023-04-19 RX ADMIN — MAGNESIUM SULFATE HEPTAHYDRATE 2000 MG: 40 INJECTION, SOLUTION INTRAVENOUS at 21:19

## 2023-04-19 RX ADMIN — SODIUM CHLORIDE 1000 ML: 9 INJECTION, SOLUTION INTRAVENOUS at 22:06

## 2023-04-19 RX ADMIN — POTASSIUM CHLORIDE 60 MEQ: 1500 TABLET, EXTENDED RELEASE ORAL at 21:24

## 2023-04-19 RX ADMIN — POTASSIUM CHLORIDE 10 MEQ: 10 INJECTION, SOLUTION INTRAVENOUS at 21:23

## 2023-04-19 RX ADMIN — ACETAMINOPHEN 1000 MG: 500 TABLET ORAL at 23:38

## 2023-04-19 RX ADMIN — POTASSIUM CHLORIDE 10 MEQ: 10 INJECTION, SOLUTION INTRAVENOUS at 22:57

## 2023-04-19 ASSESSMENT — PAIN - FUNCTIONAL ASSESSMENT: PAIN_FUNCTIONAL_ASSESSMENT: NONE - DENIES PAIN

## 2023-04-20 VITALS
HEIGHT: 64 IN | HEART RATE: 68 BPM | OXYGEN SATURATION: 100 % | BODY MASS INDEX: 29.76 KG/M2 | DIASTOLIC BLOOD PRESSURE: 59 MMHG | TEMPERATURE: 97.8 F | SYSTOLIC BLOOD PRESSURE: 132 MMHG | RESPIRATION RATE: 13 BRPM

## 2023-04-20 LAB
EKG ATRIAL RATE: 75 BPM
EKG DIAGNOSIS: NORMAL
EKG P AXIS: 83 DEGREES
EKG P-R INTERVAL: 210 MS
EKG Q-T INTERVAL: 416 MS
EKG QRS DURATION: 90 MS
EKG QTC CALCULATION (BAZETT): 464 MS
EKG R AXIS: 3 DEGREES
EKG T AXIS: 39 DEGREES
EKG VENTRICULAR RATE: 75 BPM
POTASSIUM SERPL-SCNC: 3.4 MMOL/L (ref 3.5–5.1)

## 2023-04-20 PROCEDURE — 84132 ASSAY OF SERUM POTASSIUM: CPT

## 2023-04-20 RX ORDER — MULTIVIT-MIN/IRON FUM/FOLIC AC 7.5 MG-4
1 TABLET ORAL DAILY
Qty: 30 TABLET | Refills: 3 | Status: SHIPPED | OUTPATIENT
Start: 2023-04-20

## 2023-04-20 ASSESSMENT — PAIN SCALES - GENERAL: PAINLEVEL_OUTOF10: 0

## 2023-04-20 NOTE — DISCHARGE INSTRUCTIONS
We saw you in the emergency department for fall. Because your fall occurred 1 week ago and your neurologic exam is reassuring, we do not think that you needed a CAT scan of your head during this visit. Your wound will need to heal on its own over time. Your labs were significant for low magnesium and low potassium. We gave you supplements here in the emergency department and rechecked. The levels had improved. You will be prescribed a multivitamin to take at home. Regarding your poor appetite and weight loss, you will need to follow-up with your primary care provider for this. Please call them within the next 3 days to schedule an appointment. If you have any new or different concerns then please feel free to come back to the emergency department to be seen again.

## 2023-04-20 NOTE — ED PROVIDER NOTES
Disease in her sister. She reports that she quit smoking about 7 years ago. Her smoking use included cigarettes. She has a 16.50 pack-year smoking history. She has quit using smokeless tobacco.  Her smokeless tobacco use included snuff and chew. She reports that she does not currently use alcohol. She reports that she does not use drugs. Medications     Previous Medications    ASPIRIN (ASPIRIN CHILDRENS) 81 MG CHEWABLE TABLET    Take 1 tablet by mouth daily    ATORVASTATIN (LIPITOR) 40 MG TABLET    TAKE 1 TABLET BY MOUTH ONE TIME A DAY    BLOOD GLUCOSE MONITOR KIT AND SUPPLIES    Dispense sufficient amount for indicated testing frequency plus additional to accommodate PRN testing needs. Dispense all needed supplies to include: monitor, strips, lancing device, lancets, control solutions, alcohol swabs.     BLOOD GLUCOSE MONITOR STRIPS    Test twice daily    CALCIUM-VITAMIN D 600-200 MG-UNIT TABS    Take 1 tablet by mouth 2 times daily    CARVEDILOL (COREG) 25 MG TABLET    TAKE 1 TABLET BY MOUTH 2 TIMES A DAY WITH MEALS    DAPAGLIFLOZIN (FARXIGA) 10 MG TABLET    TAKE ONE TABLET BY MOUTH EVERY MORNING    DULAGLUTIDE (TRULICITY) 1.5 MA/4.7EG SOPN    Inject 1.5 mg into the skin once a week    EZETIMIBE (ZETIA) 10 MG TABLET    TAKE 1 TABLET BY MOUTH ONE TIME A DAY    FUROSEMIDE (LASIX) 40 MG TABLET    TAKE 1 TABLET BY MOUTH DAILY    GLIMEPIRIDE (AMARYL) 4 MG TABLET    TAKE 1 TABLET BY MOUTH EVERY MORNING BEFORE BREAKFAST    HANDICAP PLACARD MISC    by Does not apply route Start date: 10/27/21  End date 10/26/26    INSULIN GLARGINE (BASAGLAR KWIKPEN) 100 UNIT/ML INJECTION PEN    Inject 20 Units into the skin nightly    INSULIN PEN NEEDLE (PEN NEEDLES) 31G X 6 MM MISC    Use with basaglar nightly    LISINOPRIL (PRINIVIL;ZESTRIL) 40 MG TABLET    TAKE 1 TABLET BY MOUTH ONE TIME A DAY    PRODIGY NO CODING BLOOD GLUC STRIP    USE TO CHECK BLOOD SUGAR DAILY AND AS NEEDED    VITAMIN D3 (CHOLECALCIFEROL) 25 MCG (1000 UT) TABS
1.4 (H) 0.6 - 1.2 mg/dL    Est, Glom Filt Rate 38 (A) >60    Calcium 8.7 8.3 - 10.6 mg/dL    Total Protein 6.8 6.4 - 8.2 g/dL    Albumin 3.1 (L) 3.4 - 5.0 g/dL    Albumin/Globulin Ratio 0.8 (L) 1.1 - 2.2    Total Bilirubin 1.2 (H) 0.0 - 1.0 mg/dL    Alkaline Phosphatase 240 (H) 40 - 129 U/L    ALT 10 10 - 40 U/L    AST 26 15 - 37 U/L   Magnesium   Result Value Ref Range    Magnesium 1.70 (L) 1.80 - 2.40 mg/dL   Potassium   Result Value Ref Range    Potassium 3.4 (L) 3.5 - 5.1 mmol/L     EKG   Sinus rhythm at 75 bpm with normal axis, normal intervals (machine reading NJ interval 210 ms, this appears to be spurious likely due to low voltage and underlying motion artifact). No obvious ST or T wave changes. No signs of acute ischemia or strain. No sign specific to hypokalemia. No changes as compared to previous EKG from March 28, 2022.     MOST RECENT VITALS:  BP: (!) 148/68, Temp: 97.8 °F (36.6 °C), Heart Rate: 73, Resp: 13, SpO2: 100 %     Procedures     None    ED Course          The patient was given the following medications:  Orders Placed This Encounter   Medications    magnesium sulfate 2000 mg in 50 mL IVPB premix    potassium chloride 10 mEq/100 mL IVPB (Peripheral Line)    potassium chloride (KLOR-CON M) extended release tablet 60 mEq    0.9 % sodium chloride bolus    acetaminophen (TYLENOL) tablet 1,000 mg    Multiple Vitamins-Minerals (MULTIVITAMIN WITH MINERALS) tablet     Sig: Take 1 tablet by mouth daily     Dispense:  30 tablet     Refill:  3       CONSULTS:  None        Yadiel San MD  04/20/23 4095
Attending

## 2023-04-21 ENCOUNTER — CLINICAL DOCUMENTATION ONLY (OUTPATIENT)
Facility: CLINIC | Age: 78
End: 2023-04-21

## 2023-04-26 NOTE — TELEPHONE ENCOUNTER
Addended by: SANIYA HOYOS on: 4/26/2023 08:49 AM     Modules accepted: Orders     Next appointment 7-11-22  Last filled 5-27-21  Last visit 2-22-22.

## 2023-04-28 ENCOUNTER — OFFICE VISIT (OUTPATIENT)
Dept: CARDIOLOGY CLINIC | Age: 78
End: 2023-04-28
Payer: MEDICARE

## 2023-04-28 VITALS
SYSTOLIC BLOOD PRESSURE: 130 MMHG | DIASTOLIC BLOOD PRESSURE: 80 MMHG | WEIGHT: 154.2 LBS | BODY MASS INDEX: 26.47 KG/M2 | HEART RATE: 78 BPM | OXYGEN SATURATION: 94 %

## 2023-04-28 DIAGNOSIS — I50.32 CHRONIC DIASTOLIC CONGESTIVE HEART FAILURE (HCC): Primary | ICD-10-CM

## 2023-04-28 DIAGNOSIS — I10 ESSENTIAL HYPERTENSION: ICD-10-CM

## 2023-04-28 DIAGNOSIS — I48.0 PAROXYSMAL ATRIAL FIBRILLATION (HCC): ICD-10-CM

## 2023-04-28 PROBLEM — I50.22 CHRONIC SYSTOLIC (CONGESTIVE) HEART FAILURE (HCC): Status: ACTIVE | Noted: 2023-04-28

## 2023-04-28 PROBLEM — E11.9 TYPE 2 DIABETES MELLITUS (HCC): Status: ACTIVE | Noted: 2023-04-28

## 2023-04-28 PROCEDURE — 1090F PRES/ABSN URINE INCON ASSESS: CPT | Performed by: INTERNAL MEDICINE

## 2023-04-28 PROCEDURE — 3078F DIAST BP <80 MM HG: CPT | Performed by: INTERNAL MEDICINE

## 2023-04-28 PROCEDURE — G8400 PT W/DXA NO RESULTS DOC: HCPCS | Performed by: INTERNAL MEDICINE

## 2023-04-28 PROCEDURE — 99214 OFFICE O/P EST MOD 30 MIN: CPT | Performed by: INTERNAL MEDICINE

## 2023-04-28 PROCEDURE — G8417 CALC BMI ABV UP PARAM F/U: HCPCS | Performed by: INTERNAL MEDICINE

## 2023-04-28 PROCEDURE — 1123F ACP DISCUSS/DSCN MKR DOCD: CPT | Performed by: INTERNAL MEDICINE

## 2023-04-28 PROCEDURE — G8427 DOCREV CUR MEDS BY ELIG CLIN: HCPCS | Performed by: INTERNAL MEDICINE

## 2023-04-28 PROCEDURE — 1036F TOBACCO NON-USER: CPT | Performed by: INTERNAL MEDICINE

## 2023-04-28 PROCEDURE — 3074F SYST BP LT 130 MM HG: CPT | Performed by: INTERNAL MEDICINE

## 2023-04-28 ASSESSMENT — ENCOUNTER SYMPTOMS
COUGH: 0
SHORTNESS OF BREATH: 0
CHEST TIGHTNESS: 0
CHOKING: 0

## 2023-04-30 ENCOUNTER — APPOINTMENT (OUTPATIENT)
Dept: CT IMAGING | Age: 78
End: 2023-04-30
Payer: MEDICARE

## 2023-04-30 ENCOUNTER — APPOINTMENT (OUTPATIENT)
Dept: GENERAL RADIOLOGY | Age: 78
End: 2023-04-30
Payer: MEDICARE

## 2023-04-30 ENCOUNTER — HOSPITAL ENCOUNTER (EMERGENCY)
Age: 78
Discharge: ANOTHER HOSPITAL | End: 2023-05-01
Attending: EMERGENCY MEDICINE
Payer: MEDICARE

## 2023-04-30 DIAGNOSIS — A41.9 SEPTICEMIA (HCC): ICD-10-CM

## 2023-04-30 DIAGNOSIS — E11.10 DIABETIC KETOACIDOSIS WITHOUT COMA ASSOCIATED WITH TYPE 2 DIABETES MELLITUS (HCC): ICD-10-CM

## 2023-04-30 DIAGNOSIS — S01.01XD LACERATION OF SCALP WITHOUT FOREIGN BODY, SUBSEQUENT ENCOUNTER: ICD-10-CM

## 2023-04-30 DIAGNOSIS — H44.003 INFECTION OF BOTH EYES: Primary | ICD-10-CM

## 2023-04-30 LAB
ALBUMIN SERPL-MCNC: 2.5 G/DL (ref 3.4–5)
ALP SERPL-CCNC: 295 U/L (ref 40–129)
ALT SERPL-CCNC: 10 U/L (ref 10–40)
ANION GAP SERPL CALCULATED.3IONS-SCNC: 25 MMOL/L (ref 3–16)
ANISOCYTOSIS BLD QL SMEAR: ABNORMAL
AST SERPL-CCNC: 20 U/L (ref 15–37)
BACTERIA URNS QL MICRO: ABNORMAL /HPF
BASE EXCESS BLDV CALC-SCNC: -13 MMOL/L (ref -2–3)
BASOPHILS # BLD: 0 K/UL (ref 0–0.2)
BASOPHILS NFR BLD: 0 %
BILIRUB DIRECT SERPL-MCNC: 0.8 MG/DL (ref 0–0.3)
BILIRUB INDIRECT SERPL-MCNC: 0.6 MG/DL (ref 0–1)
BILIRUB SERPL-MCNC: 1.4 MG/DL (ref 0–1)
BILIRUB UR QL STRIP.AUTO: NEGATIVE
BUN SERPL-MCNC: 25 MG/DL (ref 7–20)
CALCIUM SERPL-MCNC: 9.1 MG/DL (ref 8.3–10.6)
CHLORIDE SERPL-SCNC: 92 MMOL/L (ref 99–110)
CLARITY UR: CLEAR
CO2 BLDV-SCNC: 13 MMOL/L
CO2 SERPL-SCNC: 11 MMOL/L (ref 21–32)
COHGB MFR BLDV: 1.1 % (ref 0–1.5)
COLOR UR: YELLOW
CREAT SERPL-MCNC: 1.5 MG/DL (ref 0.6–1.2)
CRP SERPL-MCNC: 341.4 MG/L (ref 0–5.1)
DEPRECATED RDW RBC AUTO: 16.1 % (ref 12.4–15.4)
DO-HGB MFR BLDV: 20.1 %
EOSINOPHIL # BLD: 0 K/UL (ref 0–0.6)
EOSINOPHIL NFR BLD: 0 %
EPI CELLS #/AREA URNS HPF: ABNORMAL /HPF (ref 0–5)
ERYTHROCYTE [SEDIMENTATION RATE] IN BLOOD BY WESTERGREN METHOD: 130 MM/HR (ref 0–30)
GFR SERPLBLD CREATININE-BSD FMLA CKD-EPI: 35 ML/MIN/{1.73_M2}
GLUCOSE BLD-MCNC: 519 MG/DL (ref 70–99)
GLUCOSE BLD-MCNC: 542 MG/DL (ref 70–99)
GLUCOSE SERPL-MCNC: 598 MG/DL (ref 70–99)
GLUCOSE UR STRIP.AUTO-MCNC: >=1000 MG/DL
HCO3 BLDV-SCNC: 12.4 MMOL/L (ref 24–28)
HCT VFR BLD AUTO: 24.1 % (ref 36–48)
HGB BLD-MCNC: 7.9 G/DL (ref 12–16)
HGB UR QL STRIP.AUTO: ABNORMAL
KETONES UR STRIP.AUTO-MCNC: 40 MG/DL
LACTATE BLDV-SCNC: 1.5 MMOL/L (ref 0.4–1.9)
LEUKOCYTE ESTERASE UR QL STRIP.AUTO: NEGATIVE
LIPASE SERPL-CCNC: 6 U/L (ref 13–60)
LYMPHOCYTES # BLD: 0.3 K/UL (ref 1–5.1)
LYMPHOCYTES NFR BLD: 1 %
MCH RBC QN AUTO: 27.8 PG (ref 26–34)
MCHC RBC AUTO-ENTMCNC: 33 G/DL (ref 31–36)
MCV RBC AUTO: 84.5 FL (ref 80–100)
METHGB MFR BLDV: 0.1 % (ref 0–1.5)
MICROCYTES BLD QL SMEAR: ABNORMAL
MONOCYTES # BLD: 1.3 K/UL (ref 0–1.3)
MONOCYTES NFR BLD: 4 %
MUCOUS THREADS #/AREA URNS LPF: ABNORMAL /LPF
NEUTROPHILS # BLD: 30.6 K/UL (ref 1.7–7.7)
NEUTROPHILS NFR BLD: 93 %
NEUTS BAND NFR BLD MANUAL: 2 % (ref 0–7)
NITRITE UR QL STRIP.AUTO: NEGATIVE
PCO2 BLDV: 27 MMHG (ref 41–51)
PERFORMED ON: ABNORMAL
PERFORMED ON: ABNORMAL
PH BLDV: 7.27 [PH] (ref 7.35–7.45)
PH UR STRIP.AUTO: 7.5 [PH] (ref 5–8)
PLATELET # BLD AUTO: 403 K/UL (ref 135–450)
PLATELET BLD QL SMEAR: ABNORMAL
PMV BLD AUTO: 9.2 FL (ref 5–10.5)
PO2 BLDV: 48 MMHG (ref 25–40)
POTASSIUM SERPL-SCNC: 4.2 MMOL/L (ref 3.5–5.1)
PROT SERPL-MCNC: 6.9 G/DL (ref 6.4–8.2)
PROT UR STRIP.AUTO-MCNC: ABNORMAL MG/DL
RBC # BLD AUTO: 2.85 M/UL (ref 4–5.2)
RBC #/AREA URNS HPF: ABNORMAL /HPF (ref 0–4)
SAO2 % BLDV: 80 %
SLIDE REVIEW: ABNORMAL
SODIUM SERPL-SCNC: 128 MMOL/L (ref 136–145)
SP GR UR STRIP.AUTO: 1.02 (ref 1–1.03)
UA COMPLETE W REFLEX CULTURE PNL UR: ABNORMAL
UA DIPSTICK W REFLEX MICRO PNL UR: YES
URN SPEC COLLECT METH UR: ABNORMAL
UROBILINOGEN UR STRIP-ACNC: 0.2 E.U./DL
WBC # BLD AUTO: 32.2 K/UL (ref 4–11)
WBC #/AREA URNS HPF: ABNORMAL /HPF (ref 0–5)

## 2023-04-30 PROCEDURE — 6360000002 HC RX W HCPCS

## 2023-04-30 PROCEDURE — 87040 BLOOD CULTURE FOR BACTERIA: CPT

## 2023-04-30 PROCEDURE — 83690 ASSAY OF LIPASE: CPT

## 2023-04-30 PROCEDURE — 6370000000 HC RX 637 (ALT 250 FOR IP)

## 2023-04-30 PROCEDURE — 80048 BASIC METABOLIC PNL TOTAL CA: CPT

## 2023-04-30 PROCEDURE — 70450 CT HEAD/BRAIN W/O DYE: CPT

## 2023-04-30 PROCEDURE — 82803 BLOOD GASES ANY COMBINATION: CPT

## 2023-04-30 PROCEDURE — 81003 URINALYSIS AUTO W/O SCOPE: CPT

## 2023-04-30 PROCEDURE — 96366 THER/PROPH/DIAG IV INF ADDON: CPT

## 2023-04-30 PROCEDURE — 99285 EMERGENCY DEPT VISIT HI MDM: CPT

## 2023-04-30 PROCEDURE — 36415 COLL VENOUS BLD VENIPUNCTURE: CPT

## 2023-04-30 PROCEDURE — 6360000004 HC RX CONTRAST MEDICATION

## 2023-04-30 PROCEDURE — 85025 COMPLETE CBC W/AUTO DIFF WBC: CPT

## 2023-04-30 PROCEDURE — 2580000003 HC RX 258

## 2023-04-30 PROCEDURE — 80076 HEPATIC FUNCTION PANEL: CPT

## 2023-04-30 PROCEDURE — 71045 X-RAY EXAM CHEST 1 VIEW: CPT

## 2023-04-30 PROCEDURE — 96365 THER/PROPH/DIAG IV INF INIT: CPT

## 2023-04-30 PROCEDURE — 85652 RBC SED RATE AUTOMATED: CPT

## 2023-04-30 PROCEDURE — 83605 ASSAY OF LACTIC ACID: CPT

## 2023-04-30 PROCEDURE — 70481 CT ORBIT/EAR/FOSSA W/DYE: CPT

## 2023-04-30 PROCEDURE — 96367 TX/PROPH/DG ADDL SEQ IV INF: CPT

## 2023-04-30 PROCEDURE — 86140 C-REACTIVE PROTEIN: CPT

## 2023-04-30 RX ORDER — SODIUM CHLORIDE, SODIUM LACTATE, POTASSIUM CHLORIDE, AND CALCIUM CHLORIDE .6; .31; .03; .02 G/100ML; G/100ML; G/100ML; G/100ML
1000 INJECTION, SOLUTION INTRAVENOUS ONCE
Status: COMPLETED | OUTPATIENT
Start: 2023-04-30 | End: 2023-04-30

## 2023-04-30 RX ORDER — GLUCAGON 1 MG/ML
1 KIT INJECTION PRN
Status: DISCONTINUED | OUTPATIENT
Start: 2023-04-30 | End: 2023-05-01 | Stop reason: HOSPADM

## 2023-04-30 RX ORDER — DEXTROSE MONOHYDRATE 100 MG/ML
INJECTION, SOLUTION INTRAVENOUS CONTINUOUS PRN
Status: DISCONTINUED | OUTPATIENT
Start: 2023-04-30 | End: 2023-05-01 | Stop reason: HOSPADM

## 2023-04-30 RX ADMIN — SODIUM CHLORIDE 0.1 UNITS/KG/HR: 9 INJECTION, SOLUTION INTRAVENOUS at 22:39

## 2023-04-30 RX ADMIN — CEFEPIME 1000 MG: 1 INJECTION, POWDER, FOR SOLUTION INTRAMUSCULAR; INTRAVENOUS at 21:29

## 2023-04-30 RX ADMIN — SODIUM CHLORIDE, POTASSIUM CHLORIDE, SODIUM LACTATE AND CALCIUM CHLORIDE 1000 ML: 600; 310; 30; 20 INJECTION, SOLUTION INTRAVENOUS at 21:28

## 2023-04-30 RX ADMIN — IOPAMIDOL 75 ML: 755 INJECTION, SOLUTION INTRAVENOUS at 22:40

## 2023-04-30 RX ADMIN — VANCOMYCIN HYDROCHLORIDE 1250 MG: 10 INJECTION, POWDER, LYOPHILIZED, FOR SOLUTION INTRAVENOUS at 22:38

## 2023-05-01 VITALS
WEIGHT: 150.35 LBS | OXYGEN SATURATION: 99 % | DIASTOLIC BLOOD PRESSURE: 59 MMHG | TEMPERATURE: 97.8 F | HEART RATE: 75 BPM | SYSTOLIC BLOOD PRESSURE: 114 MMHG | BODY MASS INDEX: 25.81 KG/M2 | RESPIRATION RATE: 20 BRPM

## 2023-05-01 LAB
GLUCOSE BLD-MCNC: 472 MG/DL (ref 70–99)
PERFORMED ON: ABNORMAL

## 2023-05-01 NOTE — ED PROVIDER NOTES
ED Attending Attestation Note     Date of evaluation: 4/30/2023    This patient was seen by the resident. I have seen and examined the patient, agree with the workup, evaluation, management and diagnosis. The care plan has been discussed. I have reviewed the ECG and concur with the resident's interpretation. My assessment reveals complaints of poorly healing wound to the scalp as well as swelling to the eyes. Patient has significantly decreased vision at baseline. She had a fall approximately 10 days ago and had a wound on the scalp which family states is not healing well. They had noted fevers at home. On arrival, patient is hemodynamically stable. She does have a wound present to the scalp. CT scan of the head does show significant gas within the tissue concern for ulceration versus necrotizing infection. Patient was started on broad-spectrum antibiotics. Patient does have an elevated white blood cell count compared to baseline, concerning for infection. Renal panel does show an anion gap and elevated glucose concerning for DKA, so insulin drip was initiated. 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 She does note decreased vision and has chemosis of the left eye which she typically is able to read using a magnifying glass. With this change in vision, ophthalmology was consulted at [facility] and the patient will be transferred for ophthalmologic evaluation. Patient will need admission to the hospital for management of her DKA as well as any ophthalmologic care and wound care for the scalp. Critical Care:  Due to the immediate potential for life-threatening deterioration due to DKA, scalp wound, and vision impairment, I spent 45 minutes providing critical care.   This time excludes time spent performing procedures but includes time spent on direct patient care, history retrieval, review of the chart, and

## 2023-05-01 NOTE — PROGRESS NOTES
Clinical Pharmacy Progress Note    Vancomycin - Management by Pharmacy    Consult Date: 4/30/23  Consulting Provider: Faraz Washington    A vancomycin loading dose of 1250 mg x1 (~ 20 mg/kg) has been ordered for the patient. If vancomycin is desired to continue on admission, a new consult must be placed for pharmacy to continue dosing.      Thank you for consulting pharmacy,  Garrett Navarro, PharmD  Main Pharmacy: [id number]

## 2023-05-04 LAB
BACTERIA BLD CULT ORG #2: NORMAL
BACTERIA BLD CULT: NORMAL